# Patient Record
Sex: FEMALE | Race: WHITE | NOT HISPANIC OR LATINO | URBAN - METROPOLITAN AREA
[De-identification: names, ages, dates, MRNs, and addresses within clinical notes are randomized per-mention and may not be internally consistent; named-entity substitution may affect disease eponyms.]

---

## 2017-01-26 ENCOUNTER — IMPORTED ENCOUNTER (OUTPATIENT)
Dept: URBAN - METROPOLITAN AREA CLINIC 1 | Facility: CLINIC | Age: 72
End: 2017-01-26

## 2017-01-26 PROBLEM — H01.005: Noted: 2017-01-26

## 2017-01-26 PROBLEM — H04.123: Noted: 2017-01-26

## 2017-01-26 PROBLEM — H01.002: Noted: 2017-01-26

## 2017-01-26 PROBLEM — H11.441: Noted: 2017-01-26

## 2017-01-26 PROBLEM — H16.143: Noted: 2017-01-26

## 2017-01-26 PROBLEM — H35.032: Noted: 2017-01-26

## 2017-01-26 PROBLEM — H35.033: Noted: 2017-01-26

## 2017-01-26 PROBLEM — Z96.1: Noted: 2017-01-26

## 2017-01-26 PROBLEM — H35.013: Noted: 2017-01-26

## 2017-01-26 PROCEDURE — 92014 COMPRE OPH EXAM EST PT 1/>: CPT

## 2017-01-26 PROCEDURE — 92015 DETERMINE REFRACTIVE STATE: CPT

## 2017-01-26 NOTE — PATIENT DISCUSSION
1.  Severe Blepharitis anterior and posterior type OU- Controlled on Ocusoft Hypochlor Lid Spray CPM QD to eyelids. Continue Hot compresses BID OU x 5 minutes. Continue Tobradex RENEA OU PRN. 2.  SYDNI w/ PEK OU-The increase of artificial tears OU BID were recommended. 3.  Conjunctival cyst OD- Stable. Observation was recommended. 4. Pseudophakia OU (Torics OU)- Doing well. 5.  Athero Vascular Dz OU- Stable6. H/o residual chalazion RLL- resolved. 7.

## 2017-02-13 ENCOUNTER — IMPORTED ENCOUNTER (OUTPATIENT)
Dept: URBAN - METROPOLITAN AREA CLINIC 1 | Facility: CLINIC | Age: 72
End: 2017-02-13

## 2017-03-15 ENCOUNTER — IMPORTED ENCOUNTER (OUTPATIENT)
Dept: URBAN - METROPOLITAN AREA CLINIC 1 | Facility: CLINIC | Age: 72
End: 2017-03-15

## 2017-03-15 NOTE — PATIENT DISCUSSION
MRX done today by PMG. No changes. Advised patient it is ok to use this MRX for computer glasses if desired.

## 2017-09-19 ENCOUNTER — IMPORTED ENCOUNTER (OUTPATIENT)
Dept: URBAN - METROPOLITAN AREA CLINIC 1 | Facility: CLINIC | Age: 72
End: 2017-09-19

## 2017-09-19 PROBLEM — H01.004: Noted: 2017-09-19

## 2017-09-19 PROBLEM — H04.123: Noted: 2017-09-19

## 2017-09-19 PROBLEM — H01.001: Noted: 2017-09-19

## 2017-09-19 PROBLEM — H16.143: Noted: 2017-09-19

## 2017-09-19 PROCEDURE — 92012 INTRM OPH EXAM EST PATIENT: CPT

## 2017-09-19 NOTE — PATIENT DISCUSSION
1.  Severe Anterior/ Posterior Blepharitis OU- Continue Ocusoft Hypochlor Lid Spray CPM QD to eyelids. Continue Hot compresses BID x 5 minutes. Restart Tobradex RENEA to lids QHS QOD for 1 week then d/c. 2.  Dry Eyes OU- Cont ATs TID OU Routinely. 3.  Pseudophakia OU (Toric)- H/o YAG Cap OU 4. H/o GR I Athero Vascular Dz OU 5. H/o Residual Chalazion RLL- Resolved. Return for an appointment in Feb 30 with Dr. Laxmi Brito.

## 2017-11-20 ENCOUNTER — IMPORTED ENCOUNTER (OUTPATIENT)
Dept: URBAN - METROPOLITAN AREA CLINIC 1 | Facility: CLINIC | Age: 72
End: 2017-11-20

## 2017-11-20 PROBLEM — H01.001: Noted: 2017-11-20

## 2017-11-20 PROBLEM — H01.004: Noted: 2017-11-20

## 2017-11-20 PROBLEM — H00.015: Noted: 2017-11-20

## 2017-11-20 PROCEDURE — 92012 INTRM OPH EXAM EST PATIENT: CPT

## 2017-11-20 NOTE — PATIENT DISCUSSION
1.  Hordeolum LLL- Cont Hot compresses and Hypochlor spray BID to lids. 2.  Severe Anterior/ Posterior Blepharitis OU- Continue Ocusoft Hypochlor Lid Spray CPM QD to eyelids. Continue Hot compresses BID x 5 minutes. Can use Tobradex RENEA to lids QHS QOD for 1 week then d/c. 3.  Dry Eyes OU- Cont ATs TID OU Routinely. 4.  Pseudophakia OU (Toric)- H/o YAG Cap OU 5. H/o GR I Athero Vascular Dz OU 6. H/o Residual Chalazion RLL- Resolved. Return as scheduled with Dr. Bruce Terry.

## 2018-02-20 ENCOUNTER — IMPORTED ENCOUNTER (OUTPATIENT)
Dept: URBAN - METROPOLITAN AREA CLINIC 1 | Facility: CLINIC | Age: 73
End: 2018-02-20

## 2018-02-20 PROBLEM — H35.013: Noted: 2018-02-20

## 2018-02-20 PROBLEM — D23.11: Noted: 2018-02-20

## 2018-02-20 PROBLEM — H00.15: Noted: 2018-02-20

## 2018-02-20 PROBLEM — H01.005: Noted: 2018-02-20

## 2018-02-20 PROBLEM — H01.002: Noted: 2018-02-20

## 2018-02-20 PROBLEM — H43.813: Noted: 2018-02-20

## 2018-02-20 PROBLEM — H16.143: Noted: 2018-02-20

## 2018-02-20 PROBLEM — H04.123: Noted: 2018-02-20

## 2018-02-20 PROBLEM — Z96.1: Noted: 2018-02-20

## 2018-02-20 PROCEDURE — 92015 DETERMINE REFRACTIVE STATE: CPT

## 2018-02-20 PROCEDURE — 92014 COMPRE OPH EXAM EST PT 1/>: CPT

## 2018-02-20 NOTE — PATIENT DISCUSSION
1.  Chalazion LLL- Patient was compliant with hot compresses without resolution. Discussed option for Incision and Drainage. Risks and Benefits discussed with patient. Patient stated complete understanding and would like to proceed with procedure. Ok to resume ASA. 2.  Severe Blepharitis anterior and posterior type OU-  Continue Ocusoft Hypochlor Lid Spray CPM QD to eyelids. Continue Hot compresses BID x 5 minutes. Can use Tobradex RENEA to lids PRN 3. Papilloma LLL- appears benign. Observe. 5. SYDNI w/ PEK OU- Stable. The continuation of artificial tears were recommended. 6.  GR I Athero Vascular Dz OU- Stable7. PVD OU- Old stable. 8.  Pseudophakia OU (Torics OU)- H/o Yag OU. 9. Cysts RUL- Appears benign. Stable. 10.  Return for an appointment for I&D chalazion LLL on wednesday with Dr. Monique Woody.

## 2018-03-06 ENCOUNTER — IMPORTED ENCOUNTER (OUTPATIENT)
Dept: URBAN - METROPOLITAN AREA CLINIC 1 | Facility: CLINIC | Age: 73
End: 2018-03-06

## 2018-03-06 PROCEDURE — 67800 REMOVE EYELID LESION: CPT

## 2018-03-06 NOTE — PATIENT DISCUSSION
Incision and drainage of chalazion on the left lower eyelid: After proper informed consent was obtained the patient was taken to the operating room and placed supine on the operating table. The left lower eye lid was prepped in the standard surgical fashion. Xylocaine 1% and Epinephrine was infiltrated around the chalazion. A chalazion speculum was then placed and the eyelid was everted. A cross incision was made through the chalazion and immediate release of the lipogranulomatous material was expressed. A curette was used to further evacuate the chalazion cavity. The speculum was removed ointment was placed and a pressure patch was then applied. The patient tolerated the procedure well and there were no complications. Use Tobradex to lid QHS x3 days then return to using only PRN to lids OU.  RTC: 3 mo 10

## 2018-03-08 PROBLEM — H00.15: Noted: 2018-03-08

## 2018-06-05 ENCOUNTER — IMPORTED ENCOUNTER (OUTPATIENT)
Dept: URBAN - METROPOLITAN AREA CLINIC 1 | Facility: CLINIC | Age: 73
End: 2018-06-05

## 2018-06-05 PROBLEM — H01.004: Noted: 2018-06-05

## 2018-06-05 PROBLEM — H01.001: Noted: 2018-06-05

## 2018-06-05 PROCEDURE — 92012 INTRM OPH EXAM EST PATIENT: CPT

## 2018-06-05 NOTE — PATIENT DISCUSSION
1.  Severe Anterior and Posterior Blepharitis OU-  Continue Ocusoft Hypochlor Lid Spray CPM QD to eyelids. Continue Hot compresses BID x 5 minutes. Can use Tobradex RENEA to lids PRN. Discussed with patient could consider Cliradex Lid Wipes PRN. 2.  Papilloma LLL- appears benign. Observe. 3. SYDNI w/ PEK OU- Continue ATs TID OU Routinely 4. H/o GR I Athero Vascular Dz OU 5. H/o PVD OU6. Pseudophakia OU (Torics OU)- H/o YAG OU.7. Cysts RUL- Appears benign. Stable. 8.  H/o I&D of Chalazion LLL- Observe. Return for an appointment in 4 mo 10 with Dr. Janel Keane.

## 2018-09-25 ENCOUNTER — IMPORTED ENCOUNTER (OUTPATIENT)
Dept: URBAN - METROPOLITAN AREA CLINIC 1 | Facility: CLINIC | Age: 73
End: 2018-09-25

## 2018-09-25 PROBLEM — H16.143: Noted: 2018-09-25

## 2018-09-25 PROBLEM — D23.11: Noted: 2018-09-25

## 2018-09-25 PROBLEM — H01.001: Noted: 2018-09-25

## 2018-09-25 PROBLEM — Z96.1: Noted: 2018-09-25

## 2018-09-25 PROBLEM — H04.123: Noted: 2018-09-25

## 2018-09-25 PROBLEM — H02.821: Noted: 2018-09-25

## 2018-09-25 PROBLEM — H01.004: Noted: 2018-09-25

## 2018-09-25 PROCEDURE — 99213 OFFICE O/P EST LOW 20 MIN: CPT

## 2018-09-25 NOTE — PATIENT DISCUSSION
Lesion upper lid benign OD - The patient has a lesion of the right upper eyelid which appears benign. Measurements were taken and observation at regular intervals was recommended. The patient was asked to report  if there is any growth.

## 2018-09-25 NOTE — PATIENT DISCUSSION
1.  Severe Anterior and Posterior Blepharitis OU-  Much improved. Continue Ocusoft Hypochlor Lid Spray PRN QD to eyelids Hot compresses BID x 5 minutes. Ok continue Tobradex RENEA to lids PRN. Discussed with patient could consider Cliradex Lid Wipes PRN. 2.  Papilloma RUL/RLL- appears benign. Observe. Stable. 3. SYDNI w/ PEK OU- Increase ATs TID OU routinely. Urged compliance w/ TID tear use OU. Continue hot compresses. 4.  Pseudophakia OU (Torics OU)- H/o YAG OU.5. H/o GR I Athero Vascular Dz OU 6. H/o PVD OU7. H/o I&D of Chalazion LLL- Observe. 8.  Return for an appointment for 30 in Feb. with Dr. Shen Jasso.

## 2018-09-25 NOTE — PATIENT DISCUSSION
Lesion lower lid benign OD - The patient has a lesion of the right lower eyelid which appears benign. Measurements were taken and observation at regular intervals was recommended. The patient was asked to report  if there is any growth.

## 2018-11-15 ENCOUNTER — IMPORTED ENCOUNTER (OUTPATIENT)
Dept: URBAN - METROPOLITAN AREA CLINIC 1 | Facility: CLINIC | Age: 73
End: 2018-11-15

## 2018-11-15 PROBLEM — H00.11: Noted: 2018-11-15

## 2018-11-15 PROCEDURE — 92012 INTRM OPH EXAM EST PATIENT: CPT

## 2018-11-15 NOTE — PATIENT DISCUSSION
1.  RUL Medially Chalazion -- Recommend Hot compresses TID x 5 minutes for 3 weeks. If without improvement discussed with patient possible Incision and Drainage procedure. Risks and benefits discussed with patient and patient states full understanding. Begin Doxycycline PO 50mg BID X's 30 Days then d/c (Dispensed #60 & ERx'd). Patient to call back for possible I&D if no improvement by 11/19/18 / 11/20/18. 2.  Severe Anterior and Posterior Blepharitis OU -- Much improved. Continue Ocusoft Hypochlor Lid Spray PRN QD to eyelids Hot compresses BID x 5 minutes. Ok continue Tobradex RENEA to lids PRN. Discussed with patient could consider Cliradex Lid Wipes PRN. 3.  Papilloma RUL / RLL (appears benign) -- Observe. Stable. 4. SYDNI w/ PEK OU -- Recommend continue the frequent use of OTC AT's TID-QID OU routinely. Urged compliance w/ routine tear use OU. Continue hot compresses. 5.  Pseudophakia w/ h/o YAG Cap OU (Torics OU) -- Doing well.6. H/o GR I Athero Vascular Dz OU 7. H/o PVD OU8. H/o I&D LLL Chalazion -- Resolved. Return as scheduled in January 2019 for 30 OU appointment with Dr. Zeynep Lopez.

## 2018-11-15 NOTE — PATIENT DISCUSSION
Severe Anterior and Posterior Blepharitis OU-  Much improved. Continue Ocusoft Hypochlor Lid Spray PRN QD to eyelids Hot compresses BID x 5 minutes. Ok continue Tobradex RENEA to lids PRN. Discussed with patient could consider Cliradex Lid Wipes PRN. Papilloma RUL/RLL- appears benign. Observe. Stable. SYDNI w/ PEK OU- Increase ATs TID OU routinely. Urged compliance w/ TID tear use OU. Continue hot compresses. Pseudophakia OU (Torics OU)- H/o YAG OU. H/o GR I Athero Vascular Dz OU H/o PVD OUH/o I&D of Chalazion LLL- Observe.

## 2018-11-20 ENCOUNTER — IMPORTED ENCOUNTER (OUTPATIENT)
Dept: URBAN - METROPOLITAN AREA CLINIC 1 | Facility: CLINIC | Age: 73
End: 2018-11-20

## 2018-11-20 PROBLEM — H00.11: Noted: 2018-11-20

## 2018-11-20 PROCEDURE — 92012 INTRM OPH EXAM EST PATIENT: CPT

## 2018-11-20 NOTE — PATIENT DISCUSSION
1.  RUL Medially Chalazion- essentially resolved -- Cont Hot compresses TID x 5 minutes daily. Reduce Doxycycline PO 50mg Qdaily x3 weeks then d/c. 2.  Severe Anterior and Posterior Blepharitis OU -- Continue Ocusoft Hypochlor Lid Spray PRN QD to eyelids Hot compresses BID x 5 minutes. Cont Tobradex RENEA to lids PRN. Discussed with patient could consider Cliradex Lid Wipes PRN. 3.  Papilloma RUL / RLL (appears benign) -- Observe. 4.  SYDNI w/ PEK OU -- Recommend continue the frequent use of OTC AT's TID-QID OU routinely. Urged compliance w/ routine tear use OU. Continue hot compresses. 5.  Pseudophakia w/ h/o YAG Cap OU (Torics OU) -- Doing well.6. H/o GR I Athero Vascular Dz OU 7. H/o PVD OU8. H/o I&D LLL Chalazion -- Resolved. Return as scheduled with Dr. Mary Wolfe.

## 2019-01-22 ENCOUNTER — IMPORTED ENCOUNTER (OUTPATIENT)
Dept: URBAN - METROPOLITAN AREA CLINIC 1 | Facility: CLINIC | Age: 74
End: 2019-01-22

## 2019-01-22 PROBLEM — H01.004: Noted: 2019-01-22

## 2019-01-22 PROBLEM — H01.001: Noted: 2019-01-22

## 2019-01-22 PROCEDURE — 92012 INTRM OPH EXAM EST PATIENT: CPT

## 2019-01-22 NOTE — PATIENT DISCUSSION
1.  Severe Anterior and Posterior Blepharitis OU increased Scurf/ MGI today OU. Patient states she has backed off on usual eyeild routine-- Compliance with gtts/ compresses & eyelid scrubs discussed. Continue Ocusoft Hypochlor Lid Spray PRN QD to eyelids Hot compresses BID x 5 minutes. Cont Tobradex RENEA to lids PRN. Discussed with patient could consider Cliradex Lid Wipes PRN. 3.  Papilloma RUL / RLL (appears benign) -- Observe. 4.  SYDNI w/ PEK OU -- Continue OTC AT's TID-QID OU routinely. Urged compliance w/ routine tear use OU. Continue hot compresses. 5.  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 H/o GR I Athero Vascular Dz OU 7. H/o PVD OU8. H/o I&D RUL LLL Chalazion -- Resolved. Return for an appointment in 4 mo 30 with Dr. Dahlia Schmid.

## 2019-04-02 ENCOUNTER — IMPORTED ENCOUNTER (OUTPATIENT)
Dept: URBAN - METROPOLITAN AREA CLINIC 1 | Facility: CLINIC | Age: 74
End: 2019-04-02

## 2019-04-02 PROBLEM — H01.001: Noted: 2019-04-02

## 2019-04-02 PROBLEM — H16.143: Noted: 2019-04-02

## 2019-04-02 PROBLEM — H04.123: Noted: 2019-04-02

## 2019-04-02 PROBLEM — H01.004: Noted: 2019-04-02

## 2019-04-02 PROCEDURE — 92012 INTRM OPH EXAM EST PATIENT: CPT

## 2019-04-02 NOTE — PATIENT DISCUSSION
1.  Severe Anterior and Posterior Blepharitis OU - Maxitrol ana maría applied to lids OU in office today. Compliance with gtts/ compresses & eyelid scrubs discussed. Increase Hot compresses BID x 5 minutes continue Ocusoft Hypochlor Lid Spray PRN QD to eyelids. Cont Tobradex ANA MARÍA to lids PRN. Discussed with patient could consider Cliradex Lid Wipes PRN. Consider low does Doxycycline if no improvement with increase in hot compresses. 2.  SYDNI w/ PEK OU- Controlled. Cont ATs TID OU routinely 3. Papilloma RUL / RLL (appears benign) -- Observe. 4.  Pseudophakia w/ h/o YAG Cap OU (Toric OU) 5. H/o GR I Athero Vascular Dz OU 6. H/o PVD OU7. H/o I&D LLL Chalazion -- Resolved. Return for an appointment for Return as scheduled 30 with Dr. David Roberts.

## 2019-07-01 ENCOUNTER — IMPORTED ENCOUNTER (OUTPATIENT)
Dept: URBAN - METROPOLITAN AREA CLINIC 1 | Facility: CLINIC | Age: 74
End: 2019-07-01

## 2019-07-01 PROBLEM — H00.14: Noted: 2019-07-01

## 2019-07-01 PROCEDURE — 92012 INTRM OPH EXAM EST PATIENT: CPT

## 2019-07-01 NOTE — PATIENT DISCUSSION
1.  Early Chalazion VIRGIE -Increase Hot compresses TID x 5 minutes for 3 weeks. Begin Doxycycline 50mg po BID (Disp#60) If without improvement discussed with patient possible Incision and Drainage procedure. Risks and benefits discussed with patient and patient states full understanding. 2.  Severe Anterior and Posterior Blepharitis OU - Continue Tobradex renea applied to lids OU daily. Compliance with gtts/ compresses & eyelid scrubs discussed. Increase Hot compresses to TID x 5 minutes continue Ocusoft Hypochlor Lid Spray PRN QD to eyelids. Cont Tobradex RENEA to lids PRN. Discussed with patient could consider Cliradex Lid Wipes PRN. 3.  SYDNI w/ PEK OU- Controlled. Cont ATs TID OU routinely 4. Papilloma RUL / RLL (appears benign) -- Observe. 5.  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 H/o GR I Athero Vascular Dz OU 7. H/o PVD OU8.   H/o I&D LLL Chalazion

## 2019-07-22 ENCOUNTER — IMPORTED ENCOUNTER (OUTPATIENT)
Dept: URBAN - METROPOLITAN AREA CLINIC 1 | Facility: CLINIC | Age: 74
End: 2019-07-22

## 2019-07-22 PROBLEM — H00.14: Noted: 2019-07-22

## 2019-07-22 PROBLEM — H01.004: Noted: 2019-07-22

## 2019-07-22 PROBLEM — H01.001: Noted: 2019-07-22

## 2019-07-22 PROCEDURE — 92012 INTRM OPH EXAM EST PATIENT: CPT

## 2019-08-21 ENCOUNTER — IMPORTED ENCOUNTER (OUTPATIENT)
Dept: URBAN - METROPOLITAN AREA CLINIC 1 | Facility: CLINIC | Age: 74
End: 2019-08-21

## 2019-08-21 PROCEDURE — 99213 OFFICE O/P EST LOW 20 MIN: CPT

## 2019-08-21 NOTE — PATIENT DISCUSSION
1.  Chalazion left upper eyelid - Resolved Hot compresses TID routinely. Cont Doxycycline 50mg PO QD as maintenance regimen (erx). 2.  Severe Anterior and Posterior Blepharitis OU - Continue Tobradex ana maría applied to lids OU daily PRN. Compliance with gtts/ compresses & eyelid scrubs discussed. Increase Hot compresses to TID x 5 minutes continue Ocusoft Hypochlor Lid Spray PRN QD to eyelids. Consider Cliradex Lid Wipes PRN. 3.  SYDNI w/ PEK OU- Controlled. Cont ATs TID OU routinely 4. Papilloma RUL / RLL (appears benign) -- 5. 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 H/o GR I Athero Vascular Dz OU 7. H/o PVD OU8. H/o I&D LLL ChalazionReturn for an appointment in PRN 30 (patient is moving) with Dr. Zeynep Lopez.

## 2019-08-21 NOTE — PATIENT DISCUSSION
Severe Anterior and Posterior Blepharitis OU - Continue Tobradex ana maría applied to lids OU daily PRN. Compliance with gtts/ compresses & eyelid scrubs discussed. Increase Hot compresses to TID x 5 minutes continue Ocusoft Hypochlor Lid Spray PRN QD to eyelids. Consider Cliradex Lid Wipes PRN. 3.  SYDNI w/ PEK OU- Controlled. Cont ATs TID OU routinely 4. Papilloma RUL / RLL (appears benign) -- 5. 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 H/o GR I Athero Vascular Dz OU 7. H/o PVD OU8. H/o I&D LLL ChalazionReturn for an appointment in PRN 30 (patient is moving) with Dr. Jeromy Sanders.

## 2019-08-26 PROBLEM — H00.14: Noted: 2019-08-26

## 2019-09-12 ENCOUNTER — IMPORTED ENCOUNTER (OUTPATIENT)
Dept: URBAN - METROPOLITAN AREA CLINIC 1 | Facility: CLINIC | Age: 74
End: 2019-09-12

## 2019-09-12 PROBLEM — H01.001: Noted: 2019-09-12

## 2019-09-12 PROBLEM — H01.112: Noted: 2019-09-12

## 2019-09-12 PROBLEM — H01.004: Noted: 2019-09-12

## 2019-09-12 PROBLEM — H00.11: Noted: 2019-09-12

## 2019-09-12 PROCEDURE — 92012 INTRM OPH EXAM EST PATIENT: CPT

## 2019-09-12 NOTE — PATIENT DISCUSSION
1.  Early Chalazion right upper eyelid - Increase Doxycycline 50mg PO BID Hot compresses TID x 5 minutes for 3 weeks. If without improvement discussed with patient possible Incision and Drainage procedure. Risks and benefits discussed with patient and patient states full understanding. 2. Severe Anterior and Posterior Blepharitis OU - Continue Tobradex ana maría applied to lids OU daily PRN. Compliance with gtts/ compresses & eyelid scrubs discussed. Increase Hot compresses to TID x 5 minutes continue Ocusoft Hypochlor Lid Spray PRN QD to eyelids. Consider Cliradex Lid Wipes PRN. 3.  SYDNI w/ PEK OU- Controlled. Cont ATs TID OU routinely 4. Papilloma RUL / RLL (appears benign) 5. 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 H/o GR I Athero Vascular Dz OU 7. H/o PVD OU8. H/o I&D LLL ChalazionReturn for an appointment for Return as scheduled with Dr. José Antonio Shields.

## 2019-09-19 ENCOUNTER — IMPORTED ENCOUNTER (OUTPATIENT)
Dept: URBAN - METROPOLITAN AREA CLINIC 1 | Facility: CLINIC | Age: 74
End: 2019-09-19

## 2019-09-19 PROBLEM — H01.001: Noted: 2019-09-19

## 2019-09-19 PROBLEM — D23.112: Noted: 2019-09-19

## 2019-09-19 PROBLEM — D23.111: Noted: 2019-09-19

## 2019-09-19 PROBLEM — D23.121: Noted: 2019-09-19

## 2019-09-19 PROBLEM — H01.004: Noted: 2019-09-19

## 2019-09-19 PROCEDURE — 92012 INTRM OPH EXAM EST PATIENT: CPT

## 2019-09-19 NOTE — PATIENT DISCUSSION
1.  Severe Anterior and Posterior Blepharitis OU - Continue Tobradex ana maría applied to lids OU daily PRN. Compliance with gtts/ compresses & eyelid scrubs discussed. Increase Hot compresses to TID x 5 minutes continue Ocusoft Hypochlor Lid Spray PRN QD to eyelids. Consider Cliradex Lid Wipes PRN. 2.  Early Chalazion right upper eyelid - Cont Doxycycline 50mg PO BID. Increase Hot compresses TID x 5 minutes for 3 weeks. If without improvement discussed with patient possible Incision and Drainage procedure. Risks and benefits discussed with patient and patient states full understanding. 3. Papilloma RUL RLL VIRGIE: apppear benign - The patient has a lesion of the right upper eyelid right lower eyelid and left upper eyelid which appear benign. Will plan excision. 4.  SYDNI w/ PEK OU- Controlled. Cont ATs TID OU routinely 5. Papilloma RUL / RLL (appears benign) 6. Pseudophakia w/ h/o YAG Cap OU (Toric OU) 7. H/o GR I Athero Vascular Dz OU 8. H/o PVD OU9. H/o I&D LLL ChalazionReturn for an appointment in Excision papilloma RUL RLL VIRGIE with Dr. Bruce Terry. Return for an appointment in 3 months 10 with Dr. Bruce Terry.

## 2019-09-19 NOTE — PATIENT DISCUSSION
Papilloma VIRGIE OS - The patient has a lesion of the left upper eyelid which appears benign. Measurements were taken and observation at regular intervals was recommended. The patient was asked to report  if there is any growth.

## 2019-09-19 NOTE — PATIENT DISCUSSION
Papilloma RLL: apppears benign OD - The patient has a lesion of the right upper eyelid which appears benign. Measurements were taken and observation at regular intervals was recommended. The patient was asked to report  if there is any growth.

## 2019-09-19 NOTE — PATIENT DISCUSSION
Early Chalazion right upper eyelid - Cont Doxycycline 50mg PO BID. Increase Hot compresses TID x 5 minutes for 3 weeks. If without improvement discussed with patient possible Incision and Drainage procedure. Risks and benefits discussed with patient and patient states full understanding. 3. SYDNI w/ PEK OU- Controlled. Cont ATs TID OU routinely 4. Papilloma RUL / RLL (appears benign) 5. 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 H/o GR I Athero Vascular Dz OU 7. H/o PVD OU8.   H/o I&D LLL Chalazion

## 2019-10-09 ENCOUNTER — IMPORTED ENCOUNTER (OUTPATIENT)
Dept: URBAN - METROPOLITAN AREA CLINIC 1 | Facility: CLINIC | Age: 74
End: 2019-10-09

## 2019-10-09 PROCEDURE — 67840 REMOVE EYELID LESION: CPT

## 2019-10-09 NOTE — PATIENT DISCUSSION
Excision on  lesion on right upper eyelid and left upper eyelid: After proper informed consent was obtained the patient was taken to the operating room and placed supine on the operating table. The left eye was then prepped in the standard surgical fashion. Attention was then turned to the left upper eyelid where one percent Xylocaine with Epinephrine was infiltrated under the lesion. A scalpel was then used to make an elliptical incision around the lesion and Ladonna scissors were used to excise the mass free. Cautery was used for hemostasis and  ointment was applied. The wound was small and therefore left to heal by secondary intention. The patient tolerated the procedure well and there were no complications. The patient was instructed to use Torbadex ana maría QHS OU to lids. Return for an appointment for Return as scheduled with Dr. Jane Churchill.

## 2019-10-09 NOTE — PATIENT DISCUSSION
Excision of lesio on right upper eyelid:  After proper informed consent was obtained the patient was taken to the operating room and placed supine on the operating table. The right eye was then prepped in the standard surgical fashion. Attention was then turned to the right upper eyelid where one percent Xylocaine with Epinephrine was infiltrated under the lesion. A scalpel was then to make an elliptical incision around the lesion and Ladonna scissors were used to excise the mass free. Cautery was used for hemostasis and ointment was applied. The wound was small and therefore left to heal by secondary intention. The patient tolerated the procedure well and there were no complications. The patient was instructed to use (INSERT OINTMENT HERE) and will return for a post operative appointment.

## 2019-10-10 ENCOUNTER — IMPORTED ENCOUNTER (OUTPATIENT)
Dept: URBAN - METROPOLITAN AREA CLINIC 1 | Facility: CLINIC | Age: 74
End: 2019-10-10

## 2019-10-10 PROCEDURE — 99024 POSTOP FOLLOW-UP VISIT: CPT

## 2019-10-10 NOTE — PATIENT DISCUSSION
1.  S/p Papilloma VIRGIE removal - Healing well cont Tobradex QHS OU to [place] for an appointment for Return as scheduled with Dr. Blaise Leal.

## 2019-10-16 PROBLEM — D23.11: Noted: 2019-10-16

## 2019-10-16 PROBLEM — D23.12: Noted: 2019-10-16

## 2019-12-19 ENCOUNTER — OFFICE VISIT (OUTPATIENT)
Dept: CARDIOLOGY CLINIC | Age: 74
End: 2019-12-19

## 2019-12-19 VITALS
HEART RATE: 68 BPM | OXYGEN SATURATION: 95 % | BODY MASS INDEX: 24.96 KG/M2 | DIASTOLIC BLOOD PRESSURE: 80 MMHG | SYSTOLIC BLOOD PRESSURE: 142 MMHG | HEIGHT: 67 IN | WEIGHT: 159 LBS | RESPIRATION RATE: 20 BRPM

## 2019-12-19 DIAGNOSIS — R00.2 PALPITATIONS: ICD-10-CM

## 2019-12-19 DIAGNOSIS — I65.23 BILATERAL CAROTID ARTERY STENOSIS: ICD-10-CM

## 2019-12-19 DIAGNOSIS — R06.09 DOE (DYSPNEA ON EXERTION): ICD-10-CM

## 2019-12-19 DIAGNOSIS — R53.82 CHRONIC FATIGUE: ICD-10-CM

## 2019-12-19 DIAGNOSIS — I48.0 PAROXYSMAL ATRIAL FIBRILLATION (HCC): Primary | ICD-10-CM

## 2019-12-19 DIAGNOSIS — R07.9 CHEST PAIN, UNSPECIFIED TYPE: ICD-10-CM

## 2019-12-19 DIAGNOSIS — I10 ESSENTIAL HYPERTENSION: ICD-10-CM

## 2019-12-19 RX ORDER — HYDRALAZINE HYDROCHLORIDE 25 MG/1
25 TABLET, FILM COATED ORAL AS NEEDED
COMMUNITY
End: 2022-03-01 | Stop reason: SDUPTHER

## 2019-12-19 RX ORDER — UBIDECARENONE 50 MG
CAPSULE ORAL
COMMUNITY

## 2019-12-19 RX ORDER — ASPIRIN 81 MG/1
TABLET ORAL DAILY
COMMUNITY
End: 2020-01-24

## 2019-12-19 RX ORDER — LOSARTAN POTASSIUM 100 MG/1
100 TABLET ORAL DAILY
COMMUNITY
End: 2020-01-24 | Stop reason: ALTCHOICE

## 2019-12-19 RX ORDER — DOXYCYCLINE 50 MG/1
50 CAPSULE ORAL DAILY
COMMUNITY
End: 2021-03-12 | Stop reason: ALTCHOICE

## 2019-12-19 RX ORDER — CALCIUM ACETATE 667 MG/1
CAPSULE ORAL
COMMUNITY
End: 2020-01-24 | Stop reason: ALTCHOICE

## 2019-12-19 NOTE — LETTER
12/19/19 Patient: Gurpreet Montoya YOB: 1945 Date of Visit: 12/19/2019 Harry Bright MD 
[facility] [address] [place] [id number] VIA Facsimile: [phone] Dear Harry Bright MD, Thank you for referring Ms. Gurpreet Montoya to [facility] for evaluation. My notes for this consultation are attached. If you have questions, please do not hesitate to call me. I look forward to following your patient along with you. Sincerely, Marlin Horta MD

## 2019-12-19 NOTE — PROGRESS NOTES
[address]  [address]  [place], [address] [facility]    Office [phone]  Fax [phone]  Cell [phone]       Rogerio Baker is a 66 y.o. female self-referred for establishment of cardiologist - palpitations and HTN. Her daughter You Lubin. Diagnoses  Encounter Diagnoses     ICD-10-CM ICD-9-CM   1. Paroxysmal atrial fibrillation (HCC) I48.0 427.31   2. Essential hypertension I10 401.9   3. Palpitations R00.2 785.1   4. Chronic fatigue R53.82 780.79   5. ORBB (dyspnea on exertion) R06.09 786.09   6. Chest pain, unspecified type R07.9 786.50   7. Bilateral carotid artery stenosis I65.23 433.10     433.30       Assessment/Recommendations:    Palpitations. I reviewed her recent Holter monitor. Although the report officially stated SVT, it looks more like AF to my review. Substrate = HTN. We had a long discussion about rhythm control and stroke prevention. Her exam and recent EKG normal. Will evaluate further with echo and lexiscan Cardiolite. Asked her to obtain copies of lab reports from former PCP and endocrinologist. She would benefit from an [facility] - will discuss after reviewing her testing. HTN. BP's have been somewhat labile over the past year, although it is hard to get a clear sense of her BP control. Normotensive in the office today. I have asked her to monitor BP BID  - Continue Losartan 100mg/d, Metoprolol 25mg/d  - Evaluate for LVH with echo. Chronic fatigue, ROBB, and atypical chest pain. Suspect the stress of her 's death playing a role. Would like to see her recent labs as noted above. Lexiscan Cardiolite and echo as noted above. Phone follow up after reviewing tests          Subjective:    No prior cardiac hx other than chronic HTN. Recent palpitations. Had an event recorder demonstrating short bursts of SVT. Rogerio Baker reports she moved here from [place] 1 week ago.  She reports she feels fatigued often so much that she cannot think clearly. She has random episodes of chest tightness. She says she \"gets winded\" with activity. Patient denies any exertional chest pain, syncope, orthopnea, edema or paroxysmal nocturnal dyspnea. Frequent palpitations, short lived. Poorly described. She has prediabetes. She is statin intolerant with hair loss. She ambulates with a cane. 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 Cardiac risk factors   HTN yes  DM  no  Smoking no  Family hx of CAD yes, father with stroke      Cardiac testing  Carotid duplex 10/10/19 - 1-49% bilateral  7 day Holter monitor 10/10-10/17/19 - short bursts of SVT reported      Past Medical History:   Diagnosis Date    Asthma     Diverticulitis     History of prolapse of bladder     History of UTI     Hypertension     Recurrent UTI     Thyroid dysfunction     Unspecified hydronephrosis     Urinary tract infection         Social History     Socioeconomic History    Marital status:      Spouse name: Not on file    Number of children: Not on file    Years of education: Not on file    Highest education level: Not on file   Tobacco Use    Smoking status: Never Smoker    Smokeless tobacco: Never Used   Substance and Sexual Activity    Alcohol use: Not Currently    Drug use: No    Sexual activity: Never       Current Outpatient Medications   Medication Sig Dispense Refill    aspirin delayed-release 81 mg tablet Take  by mouth daily.  calcium acetate (PHOSLO) 667 mg cap Take  by mouth three (3) times daily (with meals).  losartan (COZAAR) 100 mg tablet Take 100 mg by mouth daily.  hydrALAZINE (APRESOLINE) 25 mg tablet Take 25 mg by mouth as needed.  doxycycline (MONODOX) 50 mg capsule Take 50 mg by mouth two (2) times a day.  B.infantis-B.ani-B.long-B.bifi (PROBIOTIC 4X) 10-15 mg TbEC Take  by mouth.  red yeast rice 600 mg tab Take  by mouth.       cholecalciferol (VITAMIN D3) 1,000 unit tablet 2,000 Units.  cranberry extract 425 mg cap 425 mg.      multivitamin (ONE A DAY) tablet Take by Mouth.  metoprolol succinate (TOPROL XL) 50 mg XL tablet Take  by mouth daily.  omega-3 fatty acids-vitamin e (FISH OIL) 1,000 mg cap Take 1 Cap by mouth. Allergies   Allergen Reactions    Latex Other (comments) and Hives    Codeine Other (comments)     Other reaction(s): psychological reaction  \"makes me crazy\"  confused    Neomycin-Bacitracin-Polymyxin Hives    Neosporin [Hydrocortisone] Other (comments)    Polyester Fibers Rash    Polysporin [Bacitracin-Polymyxin B] Other (comments) and Hives    Povidone-Iodine Other (comments)    Pravastatin Other (comments)     Elevated LFT's. Hair loss.  Tetracycline Rash          Review of Symptoms:  Constitutional: Negative for fever, chills, malaise and diaphoresis. +fatigue  Respiratory: Negative for cough, hemoptysis, sputum production, and wheezing. +ROBB  Cardiovascular: Negative for orthopnea, claudication, leg swelling and PND. +chest tightness, palpitations  Gastrointestinal: Negative for heartburn, nausea, vomiting, blood in stool and melena. Genitourinary: Negative for dysuria and flank pain. Musculoskeletal: Negative for joint pain and back pain. +right knee pain  Skin: Negative for rash. Neurological: Negative for focal weakness, seizures, loss of consciousness, weakness and headaches. Endo/Heme/Allergies: Does not bruise/bleed easily. Psychiatric/Behavioral: Negative for memory loss. The patient does not have insomnia.       Physical Exam  Visit Vitals  /80   Pulse 68   Resp 20   Ht 5' 6.5\" (1.689 m)   Wt 159 lb (72.1 kg)   SpO2 95%   BMI 25.28 kg/m²     Wt Readings from Last 3 Encounters:   12/19/19 159 lb (72.1 kg)   11/13/17 107 lb (48.5 kg)   09/20/16 152 lb (68.9 kg)     General - WDWN  HEENT: Eyes without jaundice, Hearing intact  Neck - JVP normal, thyroid nl  Cardiac - normal S1,S2, no murmurs, rubs or gallops. No clicks  Vascular - carotids without bruits, radials, femorals and pedal pulses equal bilateral  Lungs - clear to auscultation bilaterals, no rales ,wheezing or rhonchi  Abd - soft nontender, no HSM, no abd bruits  Extremities - no edema  Neuro - nonfocal, normal gait  Psych - mood and affect normal    Labs:      Cardiographics  EKG 10/10/19 - SR 60, , otherwise normal  Carotid duplex 10/10/19 - 1-49% bilateral  7 day Holter monitor 10/10-10/17/19 - short bursts of SVT reported          Written by Manual Hives, as dictated by Victor Hugo Hazel M.D.      Victor Hugo Hazel MD

## 2019-12-19 NOTE — PATIENT INSTRUCTIONS
-Get updated labs from your primary care doctor and endocrinologist. Bring the reports when you come in for your echo. -Monitor your blood pressure at home.

## 2019-12-19 NOTE — PROGRESS NOTES
Visit Vitals  /80   Pulse 68   Resp 20   Ht 5' 6.5\" (1.689 m)   Wt 159 lb (72.1 kg)   SpO2 95%   BMI 25.28 kg/m²     Establishing new cardiologist.

## 2020-01-10 ENCOUNTER — TELEPHONE (OUTPATIENT)
Dept: CARDIOLOGY CLINIC | Age: 75
End: 2020-01-10

## 2020-01-10 NOTE — TELEPHONE ENCOUNTER
Patient has some questions about her stress test information.  Please advise    [phone]

## 2020-01-10 NOTE — TELEPHONE ENCOUNTER
PTIDX2 reviewed stress test instructions. Will hold metoprolol night before and no caffeine.   Patient would like test results and clinic note faxed to  when available

## 2020-01-14 ENCOUNTER — TELEPHONE (OUTPATIENT)
Dept: CARDIOLOGY CLINIC | Age: 75
End: 2020-01-14

## 2020-01-14 NOTE — TELEPHONE ENCOUNTER
Patient would like to know if the office has received her lab work from her doctor in [place]. Please advise.      Phone: [phone]

## 2020-01-16 ENCOUNTER — TELEPHONE (OUTPATIENT)
Dept: CARDIOLOGY CLINIC | Age: 75
End: 2020-01-16

## 2020-01-16 NOTE — TELEPHONE ENCOUNTER
Patient would like to give Jessica some blood pressure readings if still needed.  She would also like to know if her recent records can be sent to her endocrinologist  Dr Brii Duff Phone: [phone] Fax: [phone]    Phone: [phone] (please leave message if no answer)

## 2020-01-18 ENCOUNTER — TELEPHONE (OUTPATIENT)
Dept: CARDIOLOGY CLINIC | Age: 75
End: 2020-01-18

## 2020-01-18 NOTE — TELEPHONE ENCOUNTER
Lexiscan cardiolite 1/15/20 - normal perfusion  Echo 1/15/20 - EF 60%, no LVH, mild LAE      No structural or ischemic heart disease except mild LAE    Has PAF documented on holter. No ongoing sx. Substrate = HTN    BPs borderline elevated at home. She attributes this stress of moving and recent death of her . Continue to monitor, may need Rx escalation.      Atrial Fibrillation CHADSVASC2 Score Stroke Risk:   66 y.o. > 76        +2    female Female +1   CHF HX: No    + 0   HTN HX: Yes    +1   Stroke/TIA/Thromboembolism No    +0   Vascular Disease HX: No    + 0   Diabetes Mellitus No    + 0   CHADSVASC 2 Score 4      Annual Stroke Risk 4.8%- moderate-high          Favor long term anticoagulation with Eliquis 5 mg bid (reviewed labs from April 2019 - normal CBC and GFR)  Reviewed pro/cons in detail, bleeding risk, etc  She will come in provide mail order pharmacy information to [facility]  RTC 6 months

## 2020-01-20 ENCOUNTER — TELEPHONE (OUTPATIENT)
Dept: CARDIOLOGY CLINIC | Age: 75
End: 2020-01-20

## 2020-01-20 NOTE — TELEPHONE ENCOUNTER
apppointment made with Pranay Kennedy for Friday to discuss Eliquis and Afib.  Daughter has questions

## 2020-01-20 NOTE — TELEPHONE ENCOUNTER
Patient states Dr. Bryan Lozano called her on Saturday and told her about a condition that she has. She states he asked if she wanted to come in for a consult with her daughter. She states they would like to come in and would like to see him this week or next week. Please advise.      Phone: [phone]

## 2020-01-24 ENCOUNTER — OFFICE VISIT (OUTPATIENT)
Dept: CARDIOLOGY CLINIC | Age: 75
End: 2020-01-24

## 2020-01-24 ENCOUNTER — OFFICE VISIT (OUTPATIENT)
Dept: INTERNAL MEDICINE CLINIC | Age: 75
End: 2020-01-24

## 2020-01-24 VITALS
HEART RATE: 71 BPM | WEIGHT: 160.12 LBS | SYSTOLIC BLOOD PRESSURE: 142 MMHG | OXYGEN SATURATION: 98 % | DIASTOLIC BLOOD PRESSURE: 70 MMHG | HEIGHT: 65 IN | BODY MASS INDEX: 26.68 KG/M2

## 2020-01-24 DIAGNOSIS — E07.9 THYROID DYSFUNCTION: ICD-10-CM

## 2020-01-24 DIAGNOSIS — Z87.828 H/O BURNS: ICD-10-CM

## 2020-01-24 DIAGNOSIS — N13.30 HYDRONEPHROSIS: ICD-10-CM

## 2020-01-24 DIAGNOSIS — I89.0 CHRONIC ACQUIRED LYMPHEDEMA: ICD-10-CM

## 2020-01-24 DIAGNOSIS — J47.9 BRONCHIECTASIS WITHOUT COMPLICATION (HCC): ICD-10-CM

## 2020-01-24 DIAGNOSIS — I10 HYPERTENSION, UNSPECIFIED TYPE: ICD-10-CM

## 2020-01-24 DIAGNOSIS — K57.92 DIVERTICULITIS: ICD-10-CM

## 2020-01-24 DIAGNOSIS — Z00.00 MEDICARE ANNUAL WELLNESS VISIT, SUBSEQUENT: ICD-10-CM

## 2020-01-24 DIAGNOSIS — T46.6X5A ADVERSE REACTION TO STATIN MEDICATION: ICD-10-CM

## 2020-01-24 DIAGNOSIS — R79.89 LFT ELEVATION: ICD-10-CM

## 2020-01-24 DIAGNOSIS — I65.23 BILATERAL CAROTID ARTERY STENOSIS: ICD-10-CM

## 2020-01-24 DIAGNOSIS — I10 HTN, GOAL BELOW 130/80: Primary | ICD-10-CM

## 2020-01-24 DIAGNOSIS — R31.9 HEMATURIA: ICD-10-CM

## 2020-01-24 DIAGNOSIS — I48.0 PAROXYSMAL ATRIAL FIBRILLATION (HCC): Primary | ICD-10-CM

## 2020-01-24 DIAGNOSIS — K56.609 SBO (SMALL BOWEL OBSTRUCTION) (HCC): ICD-10-CM

## 2020-01-24 DIAGNOSIS — Z78.9 STATIN INTOLERANCE: ICD-10-CM

## 2020-01-24 RX ORDER — METOPROLOL SUCCINATE 50 MG/1
TABLET, EXTENDED RELEASE ORAL
Qty: 90 TAB | Refills: 2 | Status: SHIPPED | OUTPATIENT
Start: 2020-01-24 | End: 2020-06-02 | Stop reason: SDUPTHER

## 2020-01-24 RX ORDER — LOSARTAN POTASSIUM 50 MG/1
TABLET ORAL
Qty: 180 TAB | Refills: 2 | Status: SHIPPED | OUTPATIENT
Start: 2020-01-24 | End: 2020-06-02 | Stop reason: SDUPTHER

## 2020-01-24 RX ORDER — DOXYCYCLINE HYCLATE 50 MG/1
CAPSULE ORAL
COMMUNITY
Start: 2020-01-08 | End: 2021-03-12 | Stop reason: ALTCHOICE
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
## 2020-01-24 NOTE — PATIENT INSTRUCTIONS
Medicare Wellness Visit, Female The best way to live healthy is to have a lifestyle where you eat a well-balanced diet, exercise regularly, limit alcohol use, and quit all forms of tobacco/nicotine, if applicable. Regular preventive services are another way to keep healthy. Preventive services (vaccines, screening tests, monitoring & exams) can help personalize your care plan, which helps you manage your own care. Screening tests can find health problems at the earliest stages, when they are easiest to treat. [facility] follows the current, evidence-based guidelines published by the [facility] Nithin Quintanilla ([facility]) when recommending preventive services for our patients. Because we follow these guidelines, sometimes recommendations change over time as research supports it. (For example, mammograms used to be recommended annually. Even though Medicare will still pay for an annual mammogram, the newer guidelines recommend a mammogram every two years for women of average risk). Of course, you and your doctor may decide to screen more often for some diseases, based on your risk and your co-morbidities (chronic disease you are already diagnosed with). Preventive services for you include: - Medicare offers their members a free annual wellness visit, which is time for you and your primary care provider to discuss and plan for your preventive service needs. Take advantage of this benefit every year! 
-All adults over the age of 72 should receive the recommended pneumonia vaccines. Current USPSTF guidelines recommend a series of two vaccines for the best pneumonia protection.  
-All adults should have a flu vaccine yearly and a tetanus vaccine every 10 years.  
-All adults age 48 and older should receive the shingles vaccines (series of two vaccines). -All adults age 38-68 who are overweight should have a diabetes screening test once every three years. -All adults born between [birth year] and 1967 should be screened once for Hepatitis C. 
-Other screening tests and preventive services for persons with diabetes include: an eye exam to screen for diabetic retinopathy, a kidney function test, a foot exam, and stricter control over your cholesterol.  
-Cardiovascular screening for adults with routine risk involves an electrocardiogram (ECG) at intervals determined by your doctor.  
-Colorectal cancer screenings should be done for adults age 54-65 with no increased risk factors for colorectal cancer. There are a number of acceptable methods of screening for this type of cancer. Each test has its own benefits and drawbacks. Discuss with your doctor what is most appropriate for you during your annual wellness visit. The different tests include: colonoscopy (considered the best screening method), a fecal occult blood test, a fecal DNA test, and sigmoidoscopy. 
 
-A bone mass density test is recommended when a woman turns 65 to screen for osteoporosis. This test is only recommended one time, as a screening. Some providers will use this same test as a disease monitoring tool if you already have osteoporosis. -Breast cancer screenings are recommended every other year for women of normal risk, age 54-69. 
-Cervical cancer screenings for women over age 72 are only recommended with certain risk factors. Here is a list of your current Health Maintenance items (your personalized list of preventive services) with a due date: 
Health Maintenance Due Topic Date Due  
 DTaP/Tdap/Td  (1 - Tdap) 07/27/1956  Shingles Vaccine (1 of 2) 07/27/1993  Glaucoma Screening   07/27/2008 [facility] Annual Well Visit  03/17/2018  Flu Vaccine  08/01/2019

## 2020-01-24 NOTE — PATIENT INSTRUCTIONS
Please start with your Eliquis 5 mg BID Suspend your ASA for now. We will check your blood work in 1 month to ensure your Hemoglobin is stable. I will phone you with the results. Continue to monitor your stool carefully and notify me if you have any concerns for bleeding. We would recommend that you arrange for a GI evaluation just for a baseline assessment. Dr. Bruce Ventura [facility], [address]  
[phone] [facility] [id number] [place] [address]
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
## 2020-01-24 NOTE — PROGRESS NOTES
Chief Complaint   Patient presents with    Irregular Heart Beat    Hypertension       Visit Vitals  /70 (BP 1 Location: Left arm, BP Patient Position: Sitting)   Pulse 71   Ht 5' 5\" (1.651 m)   Wt 160 lb 1.9 oz (72.6 kg)   SpO2 98%   BMI 26.65 kg/m²       Patient in offices states she is a little lightheaded, due to fasting for other doctor no other cardiac complaints. No hospital stays.     No cardiac refills

## 2020-01-27 ENCOUNTER — TELEPHONE (OUTPATIENT)
Dept: INTERNAL MEDICINE CLINIC | Age: 75
End: 2020-01-27

## 2020-01-27 VITALS
TEMPERATURE: 97.9 F | RESPIRATION RATE: 16 BRPM | HEART RATE: 69 BPM | BODY MASS INDEX: 25.24 KG/M2 | SYSTOLIC BLOOD PRESSURE: 147 MMHG | DIASTOLIC BLOOD PRESSURE: 74 MMHG | OXYGEN SATURATION: 94 % | WEIGHT: 160.8 LBS | HEIGHT: 67 IN

## 2020-01-27 NOTE — TELEPHONE ENCOUNTER
Patient is concerned if she should continue taking calcium. 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 She notes that her calcium has dropped to 9.1 while taking calcium. Mentioned that during her last visit she was told to discontinue calcium. Please advise.

## 2020-01-30 NOTE — TELEPHONE ENCOUNTER
Patient notified can take calcium citrate 500 mg daily. Patient states she has 600 mg on hand & will take that.

## 2020-02-17 ENCOUNTER — TELEPHONE (OUTPATIENT)
Dept: CARDIOLOGY CLINIC | Age: 75
End: 2020-02-17

## 2020-02-17 NOTE — TELEPHONE ENCOUNTER
Patient requesting to speak with Dr. Lawson Lopez in regards to the brochure she received and eliquis, baby aspiran.  Please advise    JKZWZ:[phone]

## 2020-02-18 NOTE — TELEPHONE ENCOUNTER
PTIDX2 clarified to hold ASA and education on Eliquis and bleeding given.   Patient verbalized understanding

## 2020-03-18 LAB
ERYTHROCYTE [DISTWIDTH] IN BLOOD BY AUTOMATED COUNT: 12.7 % (ref 11.7–15.4)
HCT VFR BLD AUTO: 39 % (ref 34–46.6)
HGB BLD-MCNC: 13.3 G/DL (ref 11.1–15.9)
MCH RBC QN AUTO: 30 PG (ref 26.6–33)
MCHC RBC AUTO-ENTMCNC: 34.1 G/DL (ref 31.5–35.7)
MCV RBC AUTO: 88 FL (ref 79–97)
PLATELET # BLD AUTO: 303 X10E3/UL (ref 150–450)
RBC # BLD AUTO: 4.43 X10E6/UL (ref 3.77–5.28)
WBC # BLD AUTO: 7.2 X10E3/UL (ref 3.4–10.8)

## 2020-03-24 ENCOUNTER — TELEPHONE (OUTPATIENT)
Dept: INTERNAL MEDICINE CLINIC | Age: 75
End: 2020-03-24

## 2020-03-24 NOTE — TELEPHONE ENCOUNTER
Incoming call from patient who reports while out walking today she fell & landed on her left knee. She notes that was the she had a TKR on. She denies hitting her head but notes a few mild scrapes. She called because she takes eliquis & questions what is her risk. Patient denies excessive bleeding. Advised patient to watch out for signs of excessive bleeding for now. Discussed her knee may become more painful from the today's trauma. Advised she can apply ice to her knee to help with swelling. Advised if pain becomes worse or she is unable to bear weight she will need an appt either with ortho on call or here for x-rays & eval. Patient voiced understanding.

## 2020-03-25 ENCOUNTER — TELEPHONE (OUTPATIENT)
Dept: CARDIOLOGY CLINIC | Age: 75
End: 2020-03-25

## 2020-03-25 NOTE — TELEPHONE ENCOUNTER
PTIDX2 notified of normal CBC.  patient verbalized understanding and had no further questions at this time

## 2020-03-25 NOTE — TELEPHONE ENCOUNTER
----- Message from Lindsey Smith MD sent at 3/25/2020 10:11 AM EDT -----  Normal CBC.  Please let her know

## 2020-05-27 ENCOUNTER — HOSPITAL ENCOUNTER (OUTPATIENT)
Dept: LAB | Age: 75
Discharge: HOME | End: 2020-05-27
Payer: MEDICARE

## 2020-05-27 PROCEDURE — 80053 COMPREHEN METABOLIC PANEL: CPT

## 2020-05-27 PROCEDURE — 83036 HEMOGLOBIN GLYCOSYLATED A1C: CPT

## 2020-06-02 ENCOUNTER — TELEPHONE (OUTPATIENT)
Dept: INTERNAL MEDICINE CLINIC | Age: 75
End: 2020-06-02

## 2020-06-02 ENCOUNTER — OFFICE VISIT (OUTPATIENT)
Dept: INTERNAL MEDICINE CLINIC | Age: 75
End: 2020-06-02

## 2020-06-02 VITALS
DIASTOLIC BLOOD PRESSURE: 76 MMHG | TEMPERATURE: 98.1 F | HEART RATE: 62 BPM | WEIGHT: 161.8 LBS | SYSTOLIC BLOOD PRESSURE: 140 MMHG | OXYGEN SATURATION: 96 % | HEIGHT: 65 IN | BODY MASS INDEX: 26.96 KG/M2 | RESPIRATION RATE: 16 BRPM

## 2020-06-02 DIAGNOSIS — K62.5 BRBPR (BRIGHT RED BLOOD PER RECTUM): ICD-10-CM

## 2020-06-02 DIAGNOSIS — I48.0 PAROXYSMAL ATRIAL FIBRILLATION (HCC): ICD-10-CM

## 2020-06-02 DIAGNOSIS — I10 HTN, GOAL BELOW 130/80: ICD-10-CM

## 2020-06-02 DIAGNOSIS — J45.40 MODERATE PERSISTENT ASTHMA WITHOUT COMPLICATION: Primary | ICD-10-CM

## 2020-06-02 DIAGNOSIS — R73.03 PREDIABETES: ICD-10-CM

## 2020-06-02 DIAGNOSIS — L02.92 BOIL: ICD-10-CM

## 2020-06-02 PROBLEM — D64.9 ANEMIA: Status: ACTIVE | Noted: 2020-06-02

## 2020-06-02 PROBLEM — E55.9 VITAMIN D DEFICIENCY: Status: ACTIVE | Noted: 2020-06-02

## 2020-06-02 PROBLEM — E04.1 THYROID NODULE: Status: ACTIVE | Noted: 2020-06-02

## 2020-06-02 PROBLEM — E78.2 MIXED HYPERLIPIDEMIA: Status: ACTIVE | Noted: 2020-06-02

## 2020-06-02 RX ORDER — MUPIROCIN CALCIUM 20 MG/G
CREAM TOPICAL 2 TIMES DAILY
Qty: 15 G | Refills: 3 | Status: SHIPPED | OUTPATIENT
Start: 2020-06-02 | End: 2021-11-02 | Stop reason: ALTCHOICE

## 2020-06-02 RX ORDER — ALBUTEROL SULFATE 90 UG/1
1 AEROSOL, METERED RESPIRATORY (INHALATION)
Qty: 1 INHALER | Refills: 2 | Status: SHIPPED | OUTPATIENT
Start: 2020-06-02 | End: 2022-03-01 | Stop reason: SDUPTHER

## 2020-06-02 RX ORDER — LOSARTAN POTASSIUM 50 MG/1
TABLET ORAL
Qty: 180 TAB | Refills: 2 | Status: SHIPPED | OUTPATIENT
Start: 2020-06-02 | End: 2020-09-14 | Stop reason: SDUPTHER

## 2020-06-02 RX ORDER — FLUTICASONE FUROATE AND VILANTEROL TRIFENATATE 100; 25 UG/1; UG/1
1 POWDER RESPIRATORY (INHALATION) DAILY
Qty: 3 INHALER | Refills: 3 | Status: SHIPPED | OUTPATIENT
Start: 2020-06-02 | End: 2022-03-01 | Stop reason: SDUPTHER

## 2020-06-02 RX ORDER — METOPROLOL SUCCINATE 50 MG/1
TABLET, EXTENDED RELEASE ORAL
Qty: 90 TAB | Refills: 2 | Status: SHIPPED | OUTPATIENT
Start: 2020-06-02 | End: 2020-09-14 | Stop reason: SDUPTHER

## 2020-06-02 RX ORDER — CALCIUM ACETATE 667 MG/1
TABLET ORAL
COMMUNITY

## 2020-06-02 NOTE — TELEPHONE ENCOUNTER
Patient request her height to be changed on her intake profile patient states she is 6'6\" not 5'5\" Patient best contact [phone] - thank you

## 2020-06-02 NOTE — TELEPHONE ENCOUNTER
Pt was given her lab orders for future J6D and metabolic to have done before appt in Oct. If orders need to be updated before she has done, mail them to her please.

## 2020-06-02 NOTE — PROGRESS NOTES
HISTORY OF PRESENT ILLNESS  Christiana Carnes is a 66 y.o. female. HPI  Med check. Issues:  1. Since I last saw her she has been started on Eliquis by cardiology because of paroxysmal a-fib. She is worried about being on this and wants to be on a lower dose. She has not had significant bleeding, but does note a little bit of bright red blood per rectum, which has been better in the last month. Strongly encouraged to see GI for evaluation. 2. Impaired fasting glucose. A1c was 5.9. Admits to dietary noncompliance. 3. Hypertension, on Toprol 50 and Losartan 100. Blood pressure variable at home between 120-180, although recently has not been over 160. Has Hydralazine for prn use. 4. Recurrent boils. Request Bactroban. 5. Asthma. Feels that since moving to [place] she is having more trouble with feeling intermittently winded and some congestion. Needs a new Albuterol inhaler. Feels about every two weeks that she might reach for it. Discussed preventive long acting inhaler, Breo. Will start. Discussed side effects. 6. At end of visit mentions she thinks she might have a breast lump. Will refer to the breast surgeons. Review of Systems   Constitutional: Negative for chills, diaphoresis, fever, malaise/fatigue and weight loss. HENT: Negative for congestion, sinus pain and sore throat. Respiratory: Positive for cough (intermittent dry) and shortness of breath (intermitttent). Negative for wheezing. Cardiovascular: Negative for chest pain, palpitations, orthopnea, leg swelling and PND. Gastrointestinal: Positive for blood in stool (occas). Negative for abdominal pain, constipation, diarrhea, heartburn, melena and nausea. Musculoskeletal: Negative for myalgias. Neurological: Negative for dizziness and headaches. Psychiatric/Behavioral: The patient is nervous/anxious. Physical Exam  Vitals signs and nursing note reviewed. Constitutional:       Appearance: She is well-developed. HENT:      Head: Normocephalic and atraumatic. Neck:      Musculoskeletal: Normal range of motion and neck supple. Thyroid: No thyromegaly. Vascular: No carotid bruit. Cardiovascular:      Rate and Rhythm: Normal rate and regular rhythm. Heart sounds: Normal heart sounds, S1 normal and S2 normal. No murmur. Pulmonary:      Effort: Pulmonary effort is normal. No respiratory distress. Breath sounds: Normal breath sounds. No wheezing or rales. Musculoskeletal:      Right lower leg: No edema. Left lower leg: No edema. Neurological:      Mental Status: She is alert and oriented to person, place, and time. Psychiatric:         Behavior: Behavior normal.         ASSESSMENT and PLAN  Diagnoses and all orders for this visit:    1. Moderate persistent asthma without complication  -     fluticasone furoate-vilanteroL (Breo Ellipta) 100-25 mcg/dose inhaler; Take 1 Puff by inhalation daily. -     albuterol (PROVENTIL HFA, VENTOLIN HFA, PROAIR HFA) 90 mcg/actuation inhaler; Take 1 Puff by inhalation every six (6) hours as needed for Wheezing. 2. HTN, goal below 130/80  -     losartan (Cozaar) 50 mg tablet; 2 po every day brand necessary medically  -     metoprolol succinate (Toprol XL) 50 mg XL tablet; toprol brand medically necessary    3. Prediabetes    4. Paroxysmal atrial fibrillation (HCC)    5. BRBPR (bright red blood per rectum)    6. Boil  -     mupirocin calcium (BACTROBAN) 2 % topical cream; Apply  to affected area two (2) times a day. the following changes in treatment are made: start preventive long acting inhaler breo  Refer to gi for brbpr intermittent  Encouraged her to discuss with cardiology her ?  Of using lower dose eliquis  aoppt here in 4mo

## 2020-07-08 ENCOUNTER — HOSPITAL ENCOUNTER (OUTPATIENT)
Dept: LAB | Age: 75
Discharge: HOME | End: 2020-07-08

## 2020-07-08 ENCOUNTER — OFFICE VISIT (OUTPATIENT)
Dept: INTERNAL MEDICINE CLINIC | Age: 75
End: 2020-07-08

## 2020-07-08 VITALS
RESPIRATION RATE: 18 BRPM | HEART RATE: 80 BPM | SYSTOLIC BLOOD PRESSURE: 128 MMHG | DIASTOLIC BLOOD PRESSURE: 75 MMHG | WEIGHT: 162 LBS | BODY MASS INDEX: 26.03 KG/M2 | OXYGEN SATURATION: 95 % | HEIGHT: 66 IN

## 2020-07-08 DIAGNOSIS — N89.8 VAGINAL ITCHING: Primary | ICD-10-CM

## 2020-07-08 DIAGNOSIS — N76.0 ACUTE VAGINITIS: ICD-10-CM

## 2020-07-08 DIAGNOSIS — N89.8 VAGINAL ITCHING: ICD-10-CM

## 2020-07-08 DIAGNOSIS — I10 HTN, GOAL BELOW 130/80: ICD-10-CM

## 2020-07-08 RX ORDER — TOBRAMYCIN/DEXAMETHASONE 0.3 %-0.1%
OINTMENT (GRAM) OPHTHALMIC (EYE)
COMMUNITY
Start: 2020-06-08 | End: 2022-05-10

## 2020-07-08 RX ORDER — ESTRADIOL 10 UG/1
10 INSERT VAGINAL
Qty: 24 TAB | Refills: 1 | Status: SHIPPED | OUTPATIENT
Start: 2020-07-09 | End: 2021-02-23 | Stop reason: ALTCHOICE

## 2020-07-08 NOTE — PROGRESS NOTES
Room:     Identified pt with two pt identifiers(name and ). Reviewed record in preparation for visit and have obtained necessary documentation. All patient medications has been reviewed. Chief Complaint   Patient presents with    Yeast Infection       Health Maintenance Due   Topic    DTaP/Tdap/Td series (1 - Tdap)    GLAUCOMA SCREENING Q2Y        There were no vitals filed for this visit. 1.Have you traveled outside of the [address] [place] [address] in the last month No    2. Have you been in close contact with someone who is suspected to have COVID-19 or has tested positive. No    3. Do you have any signs or symptoms. ? 4. Have you been to the ER, urgent care clinic since your last visit? Hospitalized since your last visit? No    5. Have you seen or consulted any other health care providers outside of the [facility] since your last visit? Include any pap smears or colon screening. No    6. Would you like to receive your flu shot today? NO    7. Are you fasting for blood work today? NO    8. Do you have an Advanced Directive/ Living Will in place? NO  If yes, do we have a copy on file NO  If no, would you like information NO    Patient is accompanied by self I have received verbal consent from Mariluz Roman to discuss any/all medical information while they are present in the room.

## 2020-07-08 NOTE — PROGRESS NOTES
HISTORY OF PRESENT ILLNESS  Martin Guevara is a 66 y.o. female. HPI  She describes roughly a week of vaginal discomfort, burning, just at rest.  No odor to urine, no flank pain, fevers, chills, or vomiting. She is worried about UTI, noting she has had persistent UTIs in the past.  We did call in Diflucan. She is not seeing any vaginal discharge. She will be traveling and worried about traveling with the burning discomfort. She has been giving Premarin cream in the past but has not used any recently. Review of Systems   Constitutional: Negative for chills, diaphoresis and fever. Gastrointestinal: Negative for abdominal pain, nausea and vomiting. Genitourinary: Positive for dysuria. Negative for flank pain, frequency, hematuria and urgency. Physical Exam  Vitals signs and nursing note reviewed. Constitutional:       Appearance: Normal appearance. Abdominal:      General: Bowel sounds are normal.      Palpations: Abdomen is soft. Tenderness: There is no abdominal tenderness. Genitourinary:     Comments: Ext genitalia normal no lesions but very sensitive to touch winces when I touch  Her labia majora bilaterally vaginal vault well visualized and no discharge seen swab t aken    Neurological:      General: No focal deficit present. Mental Status: She is alert and oriented to person, place, and time. Psychiatric:         Behavior: Behavior normal.         ASSESSMENT and PLAN  Diagnoses and all orders for this visit:    1. Vaginal itching-I suspect her burning in vaginal area is not from bacterial uti or from infection in vagina but rather from atrophic vaginitis   Will start vagifem and await cultures  -     CULTURE, URINE; Future  -     NUSWAB VAGINITIS + HSV; Future    2. Acute vaginitis  -     estradioL (VAGIFEM) 10 mcg tab vaginal tablet; Insert 1 Tab into vagina every Monday and Thursday.  -     CULTURE, URINE; Future  -     NUSWAB VAGINITIS + HSV; Future    3.  HTN, goal below 130/80  stable

## 2020-07-09 LAB
BACTERIA SPEC CULT: NORMAL
CC UR VC: NORMAL
SERVICE CMNT-IMP: NORMAL

## 2020-07-10 ENCOUNTER — TELEPHONE (OUTPATIENT)
Dept: INTERNAL MEDICINE CLINIC | Age: 75
End: 2020-07-10

## 2020-07-10 NOTE — TELEPHONE ENCOUNTER
Spoke with patient and advised her of urine culture results. Pt stated that Dr. Sarai Anderson gave her a rx for estrogen and she noticed that it said not to take it if you have a lump in your breast.  Pt states that she does have a lump in her breast that she has had drained in the past and was told it was a benign cyst.  She has an appt with a breast specialist soon but wanted to make sure it was ok to start taking the estrogen.

## 2020-07-10 NOTE — TELEPHONE ENCOUNTER
----- Message from Cuca Rees MD sent at 7/10/2020  7:30 AM EDT -----  Notify no sign of uti just some normal vaginal jaz seen -no  Need for abx for this

## 2020-07-15 LAB
A VAGINAE DNA VAG QL NAA+PROBE: ABNORMAL SCORE
BVAB2 DNA VAG QL NAA+PROBE: ABNORMAL SCORE
C ALBICANS DNA VAG QL NAA+PROBE: POSITIVE
C GLABRATA DNA VAG QL NAA+PROBE: NEGATIVE
C TRACH RRNA SPEC QL NAA+PROBE: NEGATIVE
HSV1 DNA SPEC QL NAA+PROBE: NEGATIVE
HSV2 DNA SPEC QL NAA+PROBE: NEGATIVE
MEGA1 DNA VAG QL NAA+PROBE: ABNORMAL SCORE
N GONORRHOEA RRNA SPEC QL NAA+PROBE: NEGATIVE
SPECIMEN SOURCE: ABNORMAL
T VAGINALIS RRNA SPEC QL NAA+PROBE: NEGATIVE

## 2020-07-16 NOTE — PROGRESS NOTES
Notify swab did show yeast and the diflucan should cover this no other infection seen no bacteria-use the estrogen cream as ordered

## 2020-07-16 NOTE — PROGRESS NOTES
Patient notified of nuswab results confirming a yeast infection. Reassured she does not have a bacterial infection. Advised the diflucan that was prescribed will cover the infection & also advised she use the Vagifem as directed as well.

## 2020-08-06 ENCOUNTER — TELEPHONE (OUTPATIENT)
Dept: INTERNAL MEDICINE CLINIC | Age: 75
End: 2020-08-06

## 2020-08-06 ENCOUNTER — TELEPHONE (OUTPATIENT)
Dept: CARDIOLOGY CLINIC | Age: 75
End: 2020-08-06

## 2020-08-06 NOTE — TELEPHONE ENCOUNTER
Patient stated that she will be having extensive dental work coming up and is calling to see if she needs to be premedicated. Please advise.     Phone #: [phone]  Thanks

## 2020-08-06 NOTE — TELEPHONE ENCOUNTER
PTIDX2 instructed no premedication is needed for dental work [facility].   Patient verbalized understanding

## 2020-08-06 NOTE — TELEPHONE ENCOUNTER
Pt has an upcoming dental appt to have bridge removed that includes 3 teeth and then rebuilt. The dentist advised this will take several appts to get the bridge out. Previously she has been required to take Amoxicillin 4 caps 1 hr prior to appt and then 2 caps after 6 hrs. Would like a phone call to advise, she can be reached on her cell phone until 1 and then her home number [phone].     Preferred pharmacy [facility] [phone]

## 2020-08-06 NOTE — TELEPHONE ENCOUNTER
No indication for dental work abx  Prophylaxis-let her know the guidelines are revised because risk of antibiotic is greater than the benefit for her medical conditions so no need to use abx thanks

## 2020-08-06 NOTE — TELEPHONE ENCOUNTER
Notified patient PCP advises there is no indication for prophylactic abx treatment prior needed based off her health conditions. Explained the guidelines have changed. Patient stated her last primary dr was the one who started this regimen due to her irregular heartbeat. Discussed the guidelines have changed. Patient states she will verify with her cardiologist but was thankful for the info & the call back.

## 2020-08-12 ENCOUNTER — TELEPHONE (OUTPATIENT)
Dept: INTERNAL MEDICINE CLINIC | Age: 75
End: 2020-08-12

## 2020-08-12 RX ORDER — AMOXICILLIN 500 MG/1
2000 CAPSULE ORAL DAILY
Qty: 4 CAP | Refills: 1 | Status: SHIPPED | OUTPATIENT
Start: 2020-08-12

## 2020-08-12 NOTE — PROGRESS NOTES
Please notify her that this dosing of antibiotic is not what I recommend as it does carry risk of diarrhea from such high dose and is no longer advised-I did send in 2000 mg to take  1  Hour  Prior to her dental work as a premedication to make her oral surgeon happy but if he wants more abx he will need to order this -again this is no longer the advice we are giving but I amm willing to given her a premed 1 hour prior to dental work only thanks

## 2020-08-12 NOTE — TELEPHONE ENCOUNTER
Patient is requesting to speak with the nurse tp discuss the [address] [place] [address] PCP recommended her to , she can be reached at [phone]

## 2020-08-12 NOTE — TELEPHONE ENCOUNTER
Spoke with patient & provided PCP GYN recommendation to patient: Dr. Anup Sargent at [facility] (ph# [phone]) w/location across from [facility]. Patient verbalized appreciation of info. Patient shared that she had a mesh screen placed in her past by a uro-gynecologist when she lived in [place], [place] & she feels like this needs follow-up now. Patient will call Dr. Atkinson Comp office to make an appointment & keep PCP updated on the outcome. Patient requesting a prescription for Amoxicillin from PCP. Patient reported that next week on Thursday August 20, 2020, she will be going to an oral surgeon b/c she requires many consecutive dental procedures to have a total bridge removed, rebuilt & replaced, along with 3 crowns. Patient added that her oral surgeon is Dr. Sabrina Linton. Patient stated that due to her artificial knee, the oral surgeon will not perform the procedures unless the patient has antibiotics on board. Patient has been instructed to take 2000mg of Amoxicillin one hour before each dental procedure & another 1000mg of Amoxicillin 6 hours after the initial Amoxicillin dose. Patient was told that she needed to obtain the Amoxicillin from her PCP. Nurse will forward request to PCP. Patient provided new pharmacy information: [facility] on [address] # [phone] & store# [id number]. Pharmacy info entered into patient's chart by nurse. Patient verbalized appreciation of assistance.

## 2020-08-12 NOTE — TELEPHONE ENCOUNTER
Spoke with patient & informed her that PCP sent in four 500mg capsules of Amoxicillin to [facility], so she will have this to take prior to her first dental procedure. Nurse informed patient that PCP was not in agreement with the additional 1000mg of Amoxicillin 6 hours after the first dose, so oral surgeon would have to fill any additional Amoxicillin prescriptions that the patient needs. Patient verbalized understanding & she will confirm the additional doses with the oral surgeon at her first appointment next Thursday 8/20/20. Nurse encouraged patient to take the 2000mg Amoxicillin dose with food & for patient to consider taking a pro-biotic. Patient verbalized appreciation of this information as she has IBS, so she understands how damaging this much Amoxicillin at one time can be to the stomach as diarrhea may follow. Nurse asked patient to call office & update PCP with the oral surgery plan after her first appointment & patient verbalized agreement to do so.
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
## 2020-09-14 ENCOUNTER — OFFICE VISIT (OUTPATIENT)
Dept: CARDIOLOGY CLINIC | Age: 75
End: 2020-09-14
Payer: MEDICARE

## 2020-09-14 VITALS
WEIGHT: 165 LBS | DIASTOLIC BLOOD PRESSURE: 90 MMHG | SYSTOLIC BLOOD PRESSURE: 160 MMHG | HEIGHT: 66 IN | BODY MASS INDEX: 26.52 KG/M2 | OXYGEN SATURATION: 96 % | HEART RATE: 64 BPM

## 2020-09-14 DIAGNOSIS — I10 HTN, GOAL BELOW 130/80: ICD-10-CM

## 2020-09-14 DIAGNOSIS — I65.23 BILATERAL CAROTID ARTERY STENOSIS: ICD-10-CM

## 2020-09-14 DIAGNOSIS — E78.2 MIXED HYPERLIPIDEMIA: ICD-10-CM

## 2020-09-14 DIAGNOSIS — I48.0 PAROXYSMAL ATRIAL FIBRILLATION (HCC): Primary | ICD-10-CM

## 2020-09-14 DIAGNOSIS — I10 ESSENTIAL HYPERTENSION: ICD-10-CM

## 2020-09-14 DIAGNOSIS — R73.03 PREDIABETES: ICD-10-CM

## 2020-09-14 PROCEDURE — G8753 SYS BP > OR = 140: HCPCS | Performed by: SPECIALIST

## 2020-09-14 PROCEDURE — G8432 DEP SCR NOT DOC, RNG: HCPCS | Performed by: SPECIALIST

## 2020-09-14 PROCEDURE — 99214 OFFICE O/P EST MOD 30 MIN: CPT | Performed by: SPECIALIST

## 2020-09-14 PROCEDURE — G8419 CALC BMI OUT NRM PARAM NOF/U: HCPCS | Performed by: SPECIALIST

## 2020-09-14 PROCEDURE — 1090F PRES/ABSN URINE INCON ASSESS: CPT | Performed by: SPECIALIST

## 2020-09-14 PROCEDURE — 1101F PT FALLS ASSESS-DOCD LE1/YR: CPT | Performed by: SPECIALIST

## 2020-09-14 PROCEDURE — G0463 HOSPITAL OUTPT CLINIC VISIT: HCPCS | Performed by: SPECIALIST

## 2020-09-14 PROCEDURE — G8755 DIAS BP > OR = 90: HCPCS | Performed by: SPECIALIST

## 2020-09-14 PROCEDURE — G8536 NO DOC ELDER MAL SCRN: HCPCS | Performed by: SPECIALIST

## 2020-09-14 PROCEDURE — G8427 DOCREV CUR MEDS BY ELIG CLIN: HCPCS | Performed by: SPECIALIST

## 2020-09-14 PROCEDURE — G8399 PT W/DXA RESULTS DOCUMENT: HCPCS | Performed by: SPECIALIST

## 2020-09-14 RX ORDER — LOSARTAN POTASSIUM 50 MG/1
100 TABLET ORAL DAILY
Qty: 180 TAB | Refills: 3 | Status: SHIPPED | OUTPATIENT
Start: 2020-09-14 | End: 2020-10-15 | Stop reason: ALTCHOICE

## 2020-09-14 RX ORDER — LOSARTAN POTASSIUM 50 MG/1
TABLET ORAL
Qty: 180 TAB | Refills: 2 | Status: CANCELLED | OUTPATIENT
Start: 2020-09-14

## 2020-09-14 RX ORDER — METOPROLOL SUCCINATE 50 MG/1
TABLET, EXTENDED RELEASE ORAL
Qty: 90 TAB | Refills: 3 | Status: SHIPPED | OUTPATIENT
Start: 2020-09-14 | End: 2020-10-12 | Stop reason: SDUPTHER

## 2020-09-14 NOTE — LETTER
9/14/20 Patient: Nichole Mejia YOB: 1945 Date of Visit: 9/14/2020 Lakesha Lopez MD 
[address] [facility], [facility] [address] [id number] VIA In Basket Dear Lakesha Lopez MD, Thank you for referring Ms. Nichole Mejia to [facility] for evaluation. My notes for this consultation are attached. If you have questions, please do not hesitate to call me. I look forward to following your patient along with you. Sincerely, Sylvia Guerrero MD

## 2020-09-14 NOTE — PATIENT INSTRUCTIONS
- Please be mindful of the salt in your diet - Please keep a log of your blood pressure and bring into your next visit.

## 2020-09-14 NOTE — PROGRESS NOTES
Sabina Claudio is a 66 y.o. female    Visit Vitals  BP (!) 160/90 (BP 1 Location: Left arm, BP Patient Position: Sitting)   Pulse 64   Ht 5' 6\" (1.676 m)   Wt 165 lb (74.8 kg)   SpO2 96%   BMI 26.63 kg/m²       Chief Complaint   Patient presents with    Irregular Heart Beat     afib    Hypertension    Carotid Artery Stenosis       Chest pain no  SOB no    Dizziness yes    Swelling no  Recent hospital visit no  Refills no

## 2020-10-08 ENCOUNTER — TELEPHONE (OUTPATIENT)
Dept: CARDIOLOGY CLINIC | Age: 75
End: 2020-10-08

## 2020-10-08 NOTE — TELEPHONE ENCOUNTER
Instructed patient that recommendation to hold 2-3 days will be faxed to surgeon.     Patient verbalized understanding    Surgeon name Jan Li

## 2020-10-08 NOTE — TELEPHONE ENCOUNTER
Pt requesting to stop eliquis on 10/11 & 10/12 for a procedure on 10/13 with [facility] Pt would like a call from Josué Rucker to indicate if she can hold the medication.  Please advise    [facility]:[phone]  Fax:[phone]        [facility]:[phone]

## 2020-10-12 DIAGNOSIS — I10 HTN, GOAL BELOW 130/80: ICD-10-CM

## 2020-10-12 RX ORDER — METOPROLOL SUCCINATE 50 MG/1
TABLET, EXTENDED RELEASE ORAL
Qty: 90 TAB | Refills: 3 | Status: SHIPPED | OUTPATIENT
Start: 2020-10-12

## 2020-10-15 ENCOUNTER — OFFICE VISIT (OUTPATIENT)
Dept: INTERNAL MEDICINE CLINIC | Age: 75
End: 2020-10-15
Payer: MEDICARE

## 2020-10-15 VITALS
SYSTOLIC BLOOD PRESSURE: 150 MMHG | HEART RATE: 62 BPM | HEIGHT: 66 IN | DIASTOLIC BLOOD PRESSURE: 80 MMHG | RESPIRATION RATE: 16 BRPM | BODY MASS INDEX: 26.52 KG/M2 | WEIGHT: 165 LBS | OXYGEN SATURATION: 97 %

## 2020-10-15 DIAGNOSIS — I48.0 PAROXYSMAL ATRIAL FIBRILLATION (HCC): ICD-10-CM

## 2020-10-15 DIAGNOSIS — L02.92 BOIL: ICD-10-CM

## 2020-10-15 DIAGNOSIS — I10 HTN, GOAL BELOW 130/80: Primary | ICD-10-CM

## 2020-10-15 DIAGNOSIS — J47.9 BRONCHIECTASIS WITHOUT COMPLICATION (HCC): ICD-10-CM

## 2020-10-15 PROCEDURE — G8427 DOCREV CUR MEDS BY ELIG CLIN: HCPCS | Performed by: INTERNAL MEDICINE

## 2020-10-15 PROCEDURE — G8754 DIAS BP LESS 90: HCPCS | Performed by: INTERNAL MEDICINE

## 2020-10-15 PROCEDURE — 99214 OFFICE O/P EST MOD 30 MIN: CPT | Performed by: INTERNAL MEDICINE

## 2020-10-15 PROCEDURE — G0463 HOSPITAL OUTPT CLINIC VISIT: HCPCS | Performed by: INTERNAL MEDICINE

## 2020-10-15 PROCEDURE — G8753 SYS BP > OR = 140: HCPCS | Performed by: INTERNAL MEDICINE

## 2020-10-15 PROCEDURE — 1090F PRES/ABSN URINE INCON ASSESS: CPT | Performed by: INTERNAL MEDICINE

## 2020-10-15 PROCEDURE — 1101F PT FALLS ASSESS-DOCD LE1/YR: CPT | Performed by: INTERNAL MEDICINE

## 2020-10-15 PROCEDURE — G8399 PT W/DXA RESULTS DOCUMENT: HCPCS | Performed by: INTERNAL MEDICINE

## 2020-10-15 PROCEDURE — G8419 CALC BMI OUT NRM PARAM NOF/U: HCPCS | Performed by: INTERNAL MEDICINE

## 2020-10-15 PROCEDURE — G8510 SCR DEP NEG, NO PLAN REQD: HCPCS | Performed by: INTERNAL MEDICINE

## 2020-10-15 PROCEDURE — G8536 NO DOC ELDER MAL SCRN: HCPCS | Performed by: INTERNAL MEDICINE

## 2020-10-15 RX ORDER — OLMESARTAN MEDOXOMIL 40 MG/1
40 TABLET ORAL DAILY
Qty: 90 TAB | Refills: 1 | Status: SHIPPED | OUTPATIENT
Start: 2020-10-15 | End: 2021-02-23 | Stop reason: ALTCHOICE

## 2020-10-15 RX ORDER — MUPIROCIN CALCIUM 20 MG/G
CREAM TOPICAL 2 TIMES DAILY
Qty: 30 G | Refills: 0 | Status: SHIPPED | OUTPATIENT
Start: 2020-10-15 | End: 2021-06-18 | Stop reason: SDUPTHER

## 2020-10-15 NOTE — PROGRESS NOTES
HISTORY OF PRESENT ILLNESS  Luis Farnsworth is a 66 y.o. female. HPI  Seen at follow up. Issues:  1. Hypertension, currently on Losartan 100 mg, Toprol 25 mg, and has Hydralazine to use prn. Has taken it three times in the last month, the Hydralazine, which does make her tired. Blood pressure at home is in the 140-160 range. I have advised changing from Losartan to Olmesartan. She wants branded and I have explained that we do not have a medical reason for branded, but I will write it this way. 2. Paroxysmal a-fib, remains on Eliquis and Toprol. 3. GYN issues. Working with Dr. Monroe Sarmiento. Has had a steroid injection into a ligament in her pelvis, not sure yet if this is helping. 4. Last A1c was 5.9. She would like refill on Bactroban. Review of Systems   Constitutional: Positive for malaise/fatigue. Negative for chills, fever and weight loss. Respiratory: Positive for shortness of breath. Negative for cough, sputum production and wheezing. Cardiovascular: Negative for chest pain, palpitations, orthopnea, leg swelling and PND. Gastrointestinal: Negative for abdominal pain, heartburn, nausea and vomiting. Musculoskeletal: Negative for myalgias. Neurological: Negative for dizziness and headaches. Psychiatric/Behavioral: The patient is nervous/anxious. Physical Exam  Vitals signs and nursing note reviewed. Constitutional:       Appearance: She is well-developed. HENT:      Head: Normocephalic and atraumatic. Neck:      Musculoskeletal: Normal range of motion and neck supple. Thyroid: No thyromegaly. Vascular: No carotid bruit. Cardiovascular:      Rate and Rhythm: Normal rate and regular rhythm. Heart sounds: Normal heart sounds, S1 normal and S2 normal. No murmur. Pulmonary:      Effort: Pulmonary effort is normal. No respiratory distress. Breath sounds: Normal breath sounds. No wheezing or rales. Musculoskeletal:      Right lower leg: No edema. Left lower leg: No edema. Neurological:      Mental Status: She is alert and oriented to person, place, and time. Psychiatric:         Behavior: Behavior normal.         ASSESSMENT and PLAN  Diagnoses and all orders for this visit:    1. HTN, goal below 130/80-not fully controlled suggest change from losartan to olmesartan she prefers branded but is aware that I do not have a medical reason to require this and insurance may not cover the brand I do advise change from losartan  appt in 3-4 mo  -     olmesartan (BENICAR) 40 mg tablet; Take 1 Tab by mouth daily. Brand only    2. Paroxysmal atrial fibrillation (HCC)-cont eliquis and toprol    3. Boil  -     mupirocin calcium (BACTROBAN) 2 % topical cream; Apply  to affected area two (2) times a day. Topically every day prn brand only    4.  Bronchiectasis without complication ([facility] [address]  -     REFERRAL TO PULMONARY DISEASE

## 2021-01-28 ENCOUNTER — TELEPHONE (OUTPATIENT)
Dept: INTERNAL MEDICINE CLINIC | Age: 76
End: 2021-01-28

## 2021-01-28 NOTE — TELEPHONE ENCOUNTER
----- Message from Bobbi Mckeon sent at 1/28/2021  8:42 AM EST -----  Regarding: Dr. Alyx Smith: [phone]  General Message/Vendor Calls    Caller's first and last name: Pt.      Reason for call: Had Covid shot, was given antibiotic, unsure if she can take it. Callback required yes/no and why: Yes, confirmation on antibiotic. Best contact number(s): [phone]      Details to clarify the request: Pt received Covid shot yesterday, was given antibiotic by dentist. Is unsure if it is ok to take the antibiotic after having the Covid shot. Would like a call back to confirm if she can or cannot take it. If no answer may call [phone].       Bobbi Mckeon

## 2021-01-28 NOTE — TELEPHONE ENCOUNTER
Patient made aware can start the keflex but does need to hold the doxycycline until the keflex course is complete. Patient states she already takes daily probiotic. Patient had no further questions or concerns at this time.

## 2021-02-23 ENCOUNTER — OFFICE VISIT (OUTPATIENT)
Dept: INTERNAL MEDICINE CLINIC | Age: 76
End: 2021-02-23
Payer: MEDICARE

## 2021-02-23 VITALS
DIASTOLIC BLOOD PRESSURE: 76 MMHG | BODY MASS INDEX: 26.68 KG/M2 | HEIGHT: 66 IN | RESPIRATION RATE: 18 BRPM | OXYGEN SATURATION: 97 % | HEART RATE: 73 BPM | SYSTOLIC BLOOD PRESSURE: 132 MMHG | WEIGHT: 166 LBS | TEMPERATURE: 98.2 F

## 2021-02-23 DIAGNOSIS — I10 HTN, GOAL BELOW 130/80: Primary | ICD-10-CM

## 2021-02-23 DIAGNOSIS — J47.9 BRONCHIECTASIS WITHOUT COMPLICATION (HCC): ICD-10-CM

## 2021-02-23 DIAGNOSIS — I48.0 PAROXYSMAL ATRIAL FIBRILLATION (HCC): ICD-10-CM

## 2021-02-23 DIAGNOSIS — R73.03 PREDIABETES: ICD-10-CM

## 2021-02-23 LAB
ALBUMIN SERPL-MCNC: 3.7 G/DL (ref 3.5–5)
ALBUMIN/GLOB SERPL: 1.1 {RATIO} (ref 1.1–2.2)
ALP SERPL-CCNC: 71 U/L (ref 45–117)
ALT SERPL-CCNC: 30 U/L (ref 12–78)
ANION GAP SERPL CALC-SCNC: 6 MMOL/L (ref 5–15)
AST SERPL-CCNC: 15 U/L (ref 15–37)
BASOPHILS # BLD: 0 K/UL (ref 0–0.1)
BASOPHILS NFR BLD: 1 % (ref 0–1)
BILIRUB SERPL-MCNC: 0.5 MG/DL (ref 0.2–1)
BUN SERPL-MCNC: 18 MG/DL (ref 6–20)
BUN/CREAT SERPL: 24 (ref 12–20)
CALCIUM SERPL-MCNC: 9.3 MG/DL (ref 8.5–10.1)
CHLORIDE SERPL-SCNC: 101 MMOL/L (ref 97–108)
CO2 SERPL-SCNC: 31 MMOL/L (ref 21–32)
CREAT SERPL-MCNC: 0.74 MG/DL (ref 0.55–1.02)
DIFFERENTIAL METHOD BLD: NORMAL
EOSINOPHIL # BLD: 0.1 K/UL (ref 0–0.4)
EOSINOPHIL NFR BLD: 2 % (ref 0–7)
ERYTHROCYTE [DISTWIDTH] IN BLOOD BY AUTOMATED COUNT: 12.6 % (ref 11.5–14.5)
GLOBULIN SER CALC-MCNC: 3.5 G/DL (ref 2–4)
GLUCOSE SERPL-MCNC: 82 MG/DL (ref 65–100)
HCT VFR BLD AUTO: 42.3 % (ref 35–47)
HGB BLD-MCNC: 13.5 G/DL (ref 11.5–16)
IMM GRANULOCYTES # BLD AUTO: 0 K/UL (ref 0–0.04)
IMM GRANULOCYTES NFR BLD AUTO: 0 % (ref 0–0.5)
LYMPHOCYTES # BLD: 2 K/UL (ref 0.8–3.5)
LYMPHOCYTES NFR BLD: 31 % (ref 12–49)
MCH RBC QN AUTO: 30.5 PG (ref 26–34)
MCHC RBC AUTO-ENTMCNC: 31.9 G/DL (ref 30–36.5)
MCV RBC AUTO: 95.5 FL (ref 80–99)
MONOCYTES # BLD: 0.6 K/UL (ref 0–1)
MONOCYTES NFR BLD: 9 % (ref 5–13)
NEUTS SEG # BLD: 3.8 K/UL (ref 1.8–8)
NEUTS SEG NFR BLD: 57 % (ref 32–75)
NRBC # BLD: 0 K/UL (ref 0–0.01)
NRBC BLD-RTO: 0 PER 100 WBC
PLATELET # BLD AUTO: 289 K/UL (ref 150–400)
PMV BLD AUTO: 10.7 FL (ref 8.9–12.9)
POTASSIUM SERPL-SCNC: 4.5 MMOL/L (ref 3.5–5.1)
PROT SERPL-MCNC: 7.2 G/DL (ref 6.4–8.2)
RBC # BLD AUTO: 4.43 M/UL (ref 3.8–5.2)
SODIUM SERPL-SCNC: 138 MMOL/L (ref 136–145)
WBC # BLD AUTO: 6.5 K/UL (ref 3.6–11)

## 2021-02-23 PROCEDURE — 1101F PT FALLS ASSESS-DOCD LE1/YR: CPT | Performed by: INTERNAL MEDICINE

## 2021-02-23 PROCEDURE — G8510 SCR DEP NEG, NO PLAN REQD: HCPCS | Performed by: INTERNAL MEDICINE

## 2021-02-23 PROCEDURE — G8419 CALC BMI OUT NRM PARAM NOF/U: HCPCS | Performed by: INTERNAL MEDICINE

## 2021-02-23 PROCEDURE — 1090F PRES/ABSN URINE INCON ASSESS: CPT | Performed by: INTERNAL MEDICINE

## 2021-02-23 PROCEDURE — G8752 SYS BP LESS 140: HCPCS | Performed by: INTERNAL MEDICINE

## 2021-02-23 PROCEDURE — G8536 NO DOC ELDER MAL SCRN: HCPCS | Performed by: INTERNAL MEDICINE

## 2021-02-23 PROCEDURE — 99214 OFFICE O/P EST MOD 30 MIN: CPT | Performed by: INTERNAL MEDICINE

## 2021-02-23 PROCEDURE — G8754 DIAS BP LESS 90: HCPCS | Performed by: INTERNAL MEDICINE

## 2021-02-23 PROCEDURE — G8427 DOCREV CUR MEDS BY ELIG CLIN: HCPCS | Performed by: INTERNAL MEDICINE

## 2021-02-23 PROCEDURE — G8399 PT W/DXA RESULTS DOCUMENT: HCPCS | Performed by: INTERNAL MEDICINE

## 2021-02-23 PROCEDURE — G0463 HOSPITAL OUTPT CLINIC VISIT: HCPCS | Performed by: INTERNAL MEDICINE

## 2021-02-23 RX ORDER — AZITHROMYCIN MONOHYDRATE 10 MG/ML
1 SOLUTION/ DROPS OPHTHALMIC 2 TIMES DAILY
COMMUNITY
End: 2021-06-18 | Stop reason: ALTCHOICE

## 2021-02-23 RX ORDER — APIXABAN 5 MG/1
TABLET, FILM COATED ORAL
Qty: 180 TAB | Refills: 0 | Status: SHIPPED | OUTPATIENT
Start: 2021-02-23 | End: 2021-11-02 | Stop reason: SDUPTHER

## 2021-02-23 RX ORDER — LOSARTAN POTASSIUM 100 MG/1
100 TABLET ORAL DAILY
COMMUNITY
End: 2021-11-02 | Stop reason: DRUGHIGH

## 2021-02-23 NOTE — PROGRESS NOTES
HISTORY OF PRESENT ILLNESS  Grace Ramirez is a 66 y.o. female. HPI  Follow up for hypertension, currently on Losartan, Toprol and Hydralazine. She is seeing Dr. Juliana Charlton, who ordered an event monitor, but apparently she could not wear it because of latex allergies. She asks for my help with Eliquis, on this for a-fib. At home blood pressure is ranging 132-150/70-89. Discussed changing Losartan to Olmesartan. She declines for now, but will consider in three months. Atrial fibrillation. No shortness of breath or palpitations. Again, working with Dr. Juliana Charlton about rate. Fatigue. No evidence of bleeding. She will get her second COVID vaccine this week. Review of Systems   Constitutional: Positive for malaise/fatigue. Negative for chills, fever and weight loss. Respiratory: Negative for cough, shortness of breath and wheezing. Cardiovascular: Negative for chest pain, palpitations, orthopnea, leg swelling and PND. Gastrointestinal: Negative for blood in stool, constipation, heartburn and nausea. Musculoskeletal: Negative for myalgias. Neurological: Negative for dizziness and headaches. Physical Exam  Vitals signs and nursing note reviewed. Constitutional:       Appearance: She is well-developed. HENT:      Head: Normocephalic and atraumatic. Neck:      Musculoskeletal: Normal range of motion and neck supple. Thyroid: No thyromegaly. Vascular: No carotid bruit. Cardiovascular:      Rate and Rhythm: Normal rate. Rhythm irregular. Heart sounds: Normal heart sounds, S1 normal and S2 normal. No murmur. Pulmonary:      Effort: Pulmonary effort is normal. No respiratory distress. Breath sounds: Normal breath sounds. No wheezing or rales. Musculoskeletal:      Right lower leg: No edema. Left lower leg: No edema. Neurological:      Mental Status: She is alert and oriented to person, place, and time.    Psychiatric:         Behavior: Behavior normal. ASSESSMENT and PLAN  Diagnoses and all orders for this visit:    1. HTN, goal below 130/80-not fully controlled at  Home -advised change losartan to benicar-she wants to consider in 3 mo  -     METABOLIC PANEL, COMPREHENSIVE; Future    2. Paroxysmal atrial fibrillation (HCC)  -     Eliquis 5 mg tablet; TAKE 1 TABLET TWICE A DAY    3. Bronchiectasis without complication (HCC)  -     CBC WITH AUTOMATED DIFF; Future    4.  Prediabetes  Con to see dr Omega Simmonds

## 2021-03-01 ENCOUNTER — TELEPHONE (OUTPATIENT)
Dept: INTERNAL MEDICINE CLINIC | Age: 76
End: 2021-03-01

## 2021-03-01 NOTE — TELEPHONE ENCOUNTER
----- Message from Waleska Edmundo sent at 3/1/2021  8:08 AM EST -----  Regarding: /telephone  Contact: [phone]  General Message/Vendor Calls    Caller's first and last name:N/A      Reason for call: She had a COVID vaccine last Wednesday, and is scheduled to have a cortisone injection to her knee tomorrow, she wanted to make sure that there were no interactions. Callback required yes/no and why: Yes      Best contact number(s): [phone]      Details to clarify the request: She is also having a cortisone injection near where her uterus was she has a mesh screen, she was just concerned getting 2 cortisone injections within 2 weeks of each other.       Waleska Wyatt

## 2021-03-08 ENCOUNTER — TELEPHONE (OUTPATIENT)
Dept: INTERNAL MEDICINE CLINIC | Age: 76
End: 2021-03-08

## 2021-03-08 NOTE — TELEPHONE ENCOUNTER
----- Message from Richard Bullock sent at 3/8/2021  2:16 PM EST -----  Regarding: Dr. Hanley Reach  [id number] Message/Vendor Calls    Caller's first and last name: Pt      Reason for call: Pt would like to discuss lab results      Callback required yes/no and why: Yes, to discuss      Best contact number(s): [phone] / [phone]      Details to clarify the request: n/a      Richard Bullock

## 2021-03-08 NOTE — TELEPHONE ENCOUNTER
Patient had concerns for her elevated bun/cr level. Reassured based off her cr level her kidneys are functioning fine. Encouraged patient to drink plenty of water to help flush her kidneys & PCP will continue to monitor her levels over time. Patient was thankful for the return call & had no further questions or concerns.

## 2021-03-10 ENCOUNTER — TELEPHONE (OUTPATIENT)
Dept: INTERNAL MEDICINE CLINIC | Age: 76
End: 2021-03-10

## 2021-03-10 DIAGNOSIS — R42 VERTIGO: Primary | ICD-10-CM

## 2021-03-10 NOTE — TELEPHONE ENCOUNTER
Patient reports waking up with a vertigo spell. She states she has had vertigo for years & also has neck issues as well which can make the vertigo worse. She has scheduled a PT visit with [facility]i for today to do some therapy & her daughter will be driving her. She denies headaches, acute vision changes, chest pain, sob, numbness & tingling & weakness. She would like the PT order faxed to [phone].

## 2021-03-10 NOTE — TELEPHONE ENCOUNTER
----- Message from Luh Francis sent at 3/10/2021  8:27 AM EST -----  Regarding: Dr. Danielito Mandujano  Level 1/Escalated Issue      Caller's first and last name and relationship (if not the patient):      Best contact number(s): [phone]      What are the symptoms: dizziness and can't focus      Transfer successful - yes/no (include outcome): No, No answer      Transfer declined - yes/no (include reason):       Was caller advised to seek appropriate level of care - yes/no: yes      Details to clarify the request: Pt experiencing dizziness and she is unable to focus        Luh Francis

## 2021-03-12 ENCOUNTER — VIRTUAL VISIT (OUTPATIENT)
Dept: INTERNAL MEDICINE CLINIC | Age: 76
End: 2021-03-12
Payer: MEDICARE

## 2021-03-12 DIAGNOSIS — M54.2 NECK PAIN: Primary | ICD-10-CM

## 2021-03-12 DIAGNOSIS — I10 HTN, GOAL BELOW 130/80: ICD-10-CM

## 2021-03-12 DIAGNOSIS — I48.0 PAROXYSMAL ATRIAL FIBRILLATION (HCC): ICD-10-CM

## 2021-03-12 DIAGNOSIS — R42 VERTIGO: ICD-10-CM

## 2021-03-12 PROCEDURE — 99442 PR PHYS/QHP TELEPHONE EVALUATION 11-20 MIN: CPT | Performed by: INTERNAL MEDICINE

## 2021-03-12 NOTE — PROGRESS NOTES
Lydia Cassidy (: 1945) is a 66 y.o. female, established patient, here for evaluation of the following chief complaint(s):   Dizziness (can't focus)       ASSESSMENT/PLAN:  1. Neck pain-with stiffness and can't fully turn head working with [facility] and wants referral for neck specifically for rom  -     REFERRAL TO PHYSICAL THERAPY  2. HTN, goal below 130/80  3. Vertigo-improving with vestibular pt  4. Paroxysmal atrial fibrillation (HCC)-on eliquis and ? Re getting steroid shot from dr Jigna Sanchez as he advised for her chronic pain assoc with mesh but would need to stop eliquis for 2 days-discussed pros and cons of steroid shots-will try to increase walking to see if helps pain      No follow-ups on file. SUBJECTIVE/OBJECTIVE:  HPI   Has stiff neck and can't turn head all the way to side  Feels vertigo is improving with pt  No cp    Review of Systems     Patient-Reported Vitals 3/12/2021   Patient-Reported Weight 165lb       Physical Exam        On this date 2021 I have spent 15 minutes reviewing previous notes, test results and face to face (virtual) with the patient discussing the diagnosis and importance of compliance with the treatment plan as well as documenting on the day of the visit. Lydia Cassidy, was evaluated through a synchronous (real-time) audio only. The patient (or guardian if applicable) is aware that this is a billable service. Verbal consent to proceed has been obtained within the past 12 months. The visit was conducted pursuant to the emergency declaration under the [facility], [address] [place] [address] authority and the [employer] General Act. Patient identification was verified, and a caregiver was present when appropriate. The patient was located in a state where the provider was credentialed to provide care. An electronic signature was used to authenticate this note.   -- Kristi Gibson MD

## 2021-03-15 ENCOUNTER — TELEPHONE (OUTPATIENT)
Dept: INTERNAL MEDICINE CLINIC | Age: 76
End: 2021-03-15

## 2021-03-15 NOTE — TELEPHONE ENCOUNTER
Per PCP's request, faxed 3/12/2021 Outpatient Physical Therapy order to [facility] (fax# [phone]). Fax result report shows successful transmission of 2 pages.

## 2021-05-11 NOTE — PATIENT DISCUSSION
Papilloma RUL: apppears benign OD - The patient has a lesion of the right upper eyelid which appears benign. Measurements were taken and observation at regular intervals was recommended. The patient was asked to report  if there is any growth. Research Consent Checklist    Protocol:  Study of Daratumumab/rHuPH20 +Lenalidomide or Lenalidomide as Post-Autologous Stem Cell Transplant Maintenance Therapy in Patient with MM usingg Minimal residual Diseased to direct therapy Duration NCT#[id number]  Consent version date: 12/23/2020  The following were discussed with the patient:      Purpose of the study - who are the participants:  YES  Study design - schedule of the trial:  YES  Schedule of study tests:  YES  Study specific questionnaires:  n/a  Potential side effects:  YES  All trial medication disclosed:  YES  Follow-up schedule after active treatment:  n/a  Confidentiality:  YES    Birth control discussed:  YES       female      Type of birth control to be used: n/a   Pregnancy while on the trial:  n/a  Voluntary participation / withdrawal:  YES  Alternative options to the clinical trial:  YES  Potential benefits of a clinical trial:  YES  The screening process:  YES  The randomization process:  yes  Re-consenting upon new findings:  YES  Blinding / un-blinding:  n/a  Research department phone numbers:  YES  Contacts for patient after hours / weekends:  YES  Contact research staff if hospitalized / emergency room visit:  YES  No payment for being in the study:  YES  No screening tests strictly for research performed prior to consent:  YES    All questions answered:  YES   was not required          name and language:  n/a  HIPAA information included in consent:  YES    Consent signed, dated and witnessed (if applicable) on 05/11/2021  Copy of consent given to patient:  YES  Original consent placed in research chart:  YES  Copy of consent scanned into patient's electronic medical record:  YES    The patient's spouse was present during Step 1 Observation pre Stem Cell transplant consenting process.  All questions answered. The patient is to call writer with any further questions or concerns.

## 2021-06-08 ENCOUNTER — HOSPITAL ENCOUNTER (OUTPATIENT)
Dept: CT IMAGING | Age: 76
Discharge: HOME | End: 2021-06-08
Attending: NURSE PRACTITIONER
Payer: MEDICARE

## 2021-06-08 ENCOUNTER — OFFICE VISIT (OUTPATIENT)
Dept: INTERNAL MEDICINE CLINIC | Age: 76
End: 2021-06-08
Attending: NURSE PRACTITIONER
Payer: MEDICARE

## 2021-06-08 ENCOUNTER — DOCUMENTATION ONLY (OUTPATIENT)
Dept: INTERNAL MEDICINE CLINIC | Age: 76
End: 2021-06-08

## 2021-06-08 ENCOUNTER — APPOINTMENT (OUTPATIENT)
Dept: CT IMAGING | Age: 76
End: 2021-06-08
Attending: NURSE PRACTITIONER
Payer: MEDICARE

## 2021-06-08 VITALS
HEIGHT: 66 IN | OXYGEN SATURATION: 98 % | WEIGHT: 166 LBS | SYSTOLIC BLOOD PRESSURE: 158 MMHG | HEART RATE: 64 BPM | BODY MASS INDEX: 26.68 KG/M2 | TEMPERATURE: 97.9 F | DIASTOLIC BLOOD PRESSURE: 90 MMHG | RESPIRATION RATE: 14 BRPM

## 2021-06-08 DIAGNOSIS — R10.31 RLQ ABDOMINAL PAIN: ICD-10-CM

## 2021-06-08 DIAGNOSIS — R30.0 BURNING WITH URINATION: Primary | ICD-10-CM

## 2021-06-08 DIAGNOSIS — Z87.440 HISTORY OF RECURRENT UTI (URINARY TRACT INFECTION): ICD-10-CM

## 2021-06-08 DIAGNOSIS — K59.00 CONSTIPATION, UNSPECIFIED CONSTIPATION TYPE: ICD-10-CM

## 2021-06-08 LAB
BILIRUB UR QL STRIP: NEGATIVE
CREAT BLD-MCNC: 0.8 MG/DL (ref 0.6–1.3)
GLUCOSE UR-MCNC: NEGATIVE MG/DL
KETONES P FAST UR STRIP-MCNC: NEGATIVE MG/DL
PH UR STRIP: 6.5 [PH] (ref 4.6–8)
PROT UR QL STRIP: NEGATIVE
SP GR UR STRIP: 1.01 (ref 1–1.03)
UA UROBILINOGEN AMB POC: NORMAL (ref 0.2–1)
URINALYSIS CLARITY POC: CLEAR
URINALYSIS COLOR POC: NORMAL
URINE BLOOD POC: NORMAL
URINE LEUKOCYTES POC: NEGATIVE
URINE NITRITES POC: NEGATIVE

## 2021-06-08 PROCEDURE — G8427 DOCREV CUR MEDS BY ELIG CLIN: HCPCS | Performed by: NURSE PRACTITIONER

## 2021-06-08 PROCEDURE — 81003 URINALYSIS AUTO W/O SCOPE: CPT | Performed by: NURSE PRACTITIONER

## 2021-06-08 PROCEDURE — 74011000636 HC RX REV CODE- 636: Performed by: RADIOLOGY

## 2021-06-08 PROCEDURE — G0463 HOSPITAL OUTPT CLINIC VISIT: HCPCS | Performed by: NURSE PRACTITIONER

## 2021-06-08 PROCEDURE — 82565 ASSAY OF CREATININE: CPT

## 2021-06-08 PROCEDURE — 74177 CT ABD & PELVIS W/CONTRAST: CPT

## 2021-06-08 PROCEDURE — 1090F PRES/ABSN URINE INCON ASSESS: CPT | Performed by: NURSE PRACTITIONER

## 2021-06-08 PROCEDURE — 99214 OFFICE O/P EST MOD 30 MIN: CPT | Performed by: NURSE PRACTITIONER

## 2021-06-08 PROCEDURE — G8753 SYS BP > OR = 140: HCPCS | Performed by: NURSE PRACTITIONER

## 2021-06-08 PROCEDURE — 1101F PT FALLS ASSESS-DOCD LE1/YR: CPT | Performed by: NURSE PRACTITIONER

## 2021-06-08 PROCEDURE — G8536 NO DOC ELDER MAL SCRN: HCPCS | Performed by: NURSE PRACTITIONER

## 2021-06-08 PROCEDURE — G8432 DEP SCR NOT DOC, RNG: HCPCS | Performed by: NURSE PRACTITIONER

## 2021-06-08 PROCEDURE — G8755 DIAS BP > OR = 90: HCPCS | Performed by: NURSE PRACTITIONER

## 2021-06-08 PROCEDURE — G8419 CALC BMI OUT NRM PARAM NOF/U: HCPCS | Performed by: NURSE PRACTITIONER

## 2021-06-08 PROCEDURE — G8399 PT W/DXA RESULTS DOCUMENT: HCPCS | Performed by: NURSE PRACTITIONER

## 2021-06-08 RX ORDER — DOXYCYCLINE 50 MG/1
50 CAPSULE ORAL 2 TIMES DAILY
COMMUNITY

## 2021-06-08 RX ADMIN — IOPAMIDOL 100 ML: 755 INJECTION, SOLUTION INTRAVENOUS at 19:20

## 2021-06-08 NOTE — PROGRESS NOTES
STAT CT scan schedule for [facility] today at 7pm, pt needs to arrive at 5pm. No solid foods after 3pm
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
## 2021-06-09 ENCOUNTER — TELEPHONE (OUTPATIENT)
Dept: INTERNAL MEDICINE CLINIC | Age: 76
End: 2021-06-09

## 2021-06-09 RX ORDER — NITROFURANTOIN 25; 75 MG/1; MG/1
100 CAPSULE ORAL 2 TIMES DAILY
Qty: 10 CAPSULE | Refills: 0 | Status: SHIPPED | OUTPATIENT
Start: 2021-06-09 | End: 2022-03-01

## 2021-06-09 RX ORDER — LACTULOSE 10 G/15ML
15 SOLUTION ORAL
Qty: 60 ML | Refills: 1 | Status: SHIPPED | OUTPATIENT
Start: 2021-06-09 | End: 2021-07-08 | Stop reason: ALTCHOICE

## 2021-06-09 NOTE — TELEPHONE ENCOUNTER
I spoke with patient to advise of NP's message. Pt would like to know what laxative she recommends? Most otc ones she has tried has caused abdominal pain. Pt would like abx sent to [facility] pharmacy.

## 2021-06-09 NOTE — PROGRESS NOTES
Please call patient -- CT scan negative for acute abnormality but she has a large amount of stool throughout her colon. Recommend taking a laxative. No signs of small or large intestine obstruction. I will send in a prescription for Macrobid for her bladder and we have sent out urine culture.   Will call her with urine culture results once available

## 2021-06-09 NOTE — TELEPHONE ENCOUNTER
----- Message from Moose Duque NP sent at 6/9/2021  9:07 AM EDT -----    ----- Message -----  From: Paulie Toro Results In  Sent: 6/8/2021  10:00 PM EDT  To: Moose Duque NP

## 2021-06-10 ENCOUNTER — TRANSCRIBE ORDER (OUTPATIENT)
Dept: NEUROLOGY | Age: 76
End: 2021-06-10

## 2021-06-10 ENCOUNTER — TELEPHONE (OUTPATIENT)
Dept: INTERNAL MEDICINE CLINIC | Age: 76
End: 2021-06-10

## 2021-06-10 LAB
BACTERIA SPEC CULT: NORMAL
SERVICE CMNT-IMP: NORMAL

## 2021-06-10 NOTE — TELEPHONE ENCOUNTER
----- Message from Emil Devi sent at 6/10/2021  7:50 AM EDT -----  Regarding: IRON/MULUGETA/TELEPHONE  Contact: [phone]  General Message/Vendor Calls    Caller's first and last name: Oscar Vivar       Reason for call: lab results      Callback required yes/no and why: Yes      Best contact number(s): [phone], after today [phone]      Details to clarify the request: Patient would like a callback to discuss lab results to determine if or not she should continue taking Macrobid.         Emil Devi

## 2021-06-10 NOTE — TELEPHONE ENCOUNTER
Urine culture just returned as negative growth; she can stop taking the Macrobid; she does not have a UTI

## 2021-06-17 RX ORDER — LACTULOSE 10 G/15ML
15 SOLUTION ORAL
Qty: 60 ML | Refills: 1 | OUTPATIENT
Start: 2021-06-17

## 2021-06-17 NOTE — TELEPHONE ENCOUNTER
Patient needs a refill as she is leaving shortly to fly out of town. Patient will be seen by Dr. Barbara Fernandes on Monday but states she needs a refill before her appointment. Patient also wants to talk about some pain she is experiencing. Patient states she will be available for a return call at 1pm today.

## 2021-06-18 ENCOUNTER — OFFICE VISIT (OUTPATIENT)
Dept: INTERNAL MEDICINE CLINIC | Age: 76
End: 2021-06-18
Payer: MEDICARE

## 2021-06-18 VITALS
DIASTOLIC BLOOD PRESSURE: 89 MMHG | HEART RATE: 70 BPM | BODY MASS INDEX: 26.74 KG/M2 | OXYGEN SATURATION: 95 % | HEIGHT: 66 IN | RESPIRATION RATE: 16 BRPM | WEIGHT: 166.4 LBS | SYSTOLIC BLOOD PRESSURE: 136 MMHG | TEMPERATURE: 97.7 F

## 2021-06-18 DIAGNOSIS — K59.09 OTHER CONSTIPATION: ICD-10-CM

## 2021-06-18 DIAGNOSIS — R10.31 RLQ ABDOMINAL PAIN: Primary | ICD-10-CM

## 2021-06-18 LAB
ALBUMIN SERPL-MCNC: 3.9 G/DL (ref 3.5–5)
ALBUMIN/GLOB SERPL: 1.1 {RATIO} (ref 1.1–2.2)
ALP SERPL-CCNC: 70 U/L (ref 45–117)
ALT SERPL-CCNC: 25 U/L (ref 12–78)
ANION GAP SERPL CALC-SCNC: 7 MMOL/L (ref 5–15)
AST SERPL-CCNC: 16 U/L (ref 15–37)
BASOPHILS # BLD: 0 K/UL (ref 0–0.1)
BASOPHILS NFR BLD: 0 % (ref 0–1)
BILIRUB SERPL-MCNC: 0.4 MG/DL (ref 0.2–1)
BUN SERPL-MCNC: 20 MG/DL (ref 6–20)
BUN/CREAT SERPL: 31 (ref 12–20)
CALCIUM SERPL-MCNC: 9.9 MG/DL (ref 8.5–10.1)
CHLORIDE SERPL-SCNC: 101 MMOL/L (ref 97–108)
CO2 SERPL-SCNC: 30 MMOL/L (ref 21–32)
CREAT SERPL-MCNC: 0.64 MG/DL (ref 0.55–1.02)
DIFFERENTIAL METHOD BLD: NORMAL
EOSINOPHIL # BLD: 0.1 K/UL (ref 0–0.4)
EOSINOPHIL NFR BLD: 1 % (ref 0–7)
ERYTHROCYTE [DISTWIDTH] IN BLOOD BY AUTOMATED COUNT: 13.2 % (ref 11.5–14.5)
GLOBULIN SER CALC-MCNC: 3.6 G/DL (ref 2–4)
GLUCOSE SERPL-MCNC: 85 MG/DL (ref 65–100)
HCT VFR BLD AUTO: 44.1 % (ref 35–47)
HGB BLD-MCNC: 13.8 G/DL (ref 11.5–16)
IMM GRANULOCYTES # BLD AUTO: 0 K/UL (ref 0–0.04)
IMM GRANULOCYTES NFR BLD AUTO: 0 % (ref 0–0.5)
LYMPHOCYTES # BLD: 1.9 K/UL (ref 0.8–3.5)
LYMPHOCYTES NFR BLD: 25 % (ref 12–49)
MCH RBC QN AUTO: 30.5 PG (ref 26–34)
MCHC RBC AUTO-ENTMCNC: 31.3 G/DL (ref 30–36.5)
MCV RBC AUTO: 97.4 FL (ref 80–99)
MONOCYTES # BLD: 0.7 K/UL (ref 0–1)
MONOCYTES NFR BLD: 9 % (ref 5–13)
NEUTS SEG # BLD: 4.6 K/UL (ref 1.8–8)
NEUTS SEG NFR BLD: 65 % (ref 32–75)
NRBC # BLD: 0 K/UL (ref 0–0.01)
NRBC BLD-RTO: 0 PER 100 WBC
PLATELET # BLD AUTO: 295 K/UL (ref 150–400)
PMV BLD AUTO: 10.9 FL (ref 8.9–12.9)
POTASSIUM SERPL-SCNC: 4.3 MMOL/L (ref 3.5–5.1)
PROT SERPL-MCNC: 7.5 G/DL (ref 6.4–8.2)
RBC # BLD AUTO: 4.53 M/UL (ref 3.8–5.2)
SODIUM SERPL-SCNC: 138 MMOL/L (ref 136–145)
WBC # BLD AUTO: 7.3 K/UL (ref 3.6–11)

## 2021-06-18 PROCEDURE — G8427 DOCREV CUR MEDS BY ELIG CLIN: HCPCS | Performed by: INTERNAL MEDICINE

## 2021-06-18 PROCEDURE — G8510 SCR DEP NEG, NO PLAN REQD: HCPCS | Performed by: INTERNAL MEDICINE

## 2021-06-18 PROCEDURE — 99213 OFFICE O/P EST LOW 20 MIN: CPT | Performed by: INTERNAL MEDICINE

## 2021-06-18 PROCEDURE — G8536 NO DOC ELDER MAL SCRN: HCPCS | Performed by: INTERNAL MEDICINE

## 2021-06-18 PROCEDURE — 1090F PRES/ABSN URINE INCON ASSESS: CPT | Performed by: INTERNAL MEDICINE

## 2021-06-18 PROCEDURE — G0463 HOSPITAL OUTPT CLINIC VISIT: HCPCS | Performed by: INTERNAL MEDICINE

## 2021-06-18 PROCEDURE — 1101F PT FALLS ASSESS-DOCD LE1/YR: CPT | Performed by: INTERNAL MEDICINE

## 2021-06-18 PROCEDURE — G8399 PT W/DXA RESULTS DOCUMENT: HCPCS | Performed by: INTERNAL MEDICINE

## 2021-06-18 PROCEDURE — G8419 CALC BMI OUT NRM PARAM NOF/U: HCPCS | Performed by: INTERNAL MEDICINE

## 2021-06-18 PROCEDURE — G8754 DIAS BP LESS 90: HCPCS | Performed by: INTERNAL MEDICINE

## 2021-06-18 PROCEDURE — G8752 SYS BP LESS 140: HCPCS | Performed by: INTERNAL MEDICINE

## 2021-06-18 RX ORDER — GENTAMICIN SULFATE 0.3 %
OINTMENT (GRAM) OPHTHALMIC (EYE)
COMMUNITY
Start: 2021-02-08 | End: 2021-06-18 | Stop reason: ALTCHOICE

## 2021-06-18 RX ORDER — MUPIROCIN 20 MG/G
OINTMENT TOPICAL
COMMUNITY
Start: 2021-02-17 | End: 2021-07-08 | Stop reason: ALTCHOICE

## 2021-06-18 RX ORDER — LACTULOSE 10 G/15ML
10 SOLUTION ORAL; RECTAL
Qty: 480 ML | Refills: 3 | Status: SHIPPED | OUTPATIENT
Start: 2021-06-18
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
## 2021-07-08 ENCOUNTER — OFFICE VISIT (OUTPATIENT)
Dept: INTERNAL MEDICINE CLINIC | Age: 76
End: 2021-07-08
Payer: MEDICARE

## 2021-07-08 VITALS
SYSTOLIC BLOOD PRESSURE: 128 MMHG | WEIGHT: 165.2 LBS | BODY MASS INDEX: 26.55 KG/M2 | DIASTOLIC BLOOD PRESSURE: 78 MMHG | HEIGHT: 66 IN | HEART RATE: 71 BPM | TEMPERATURE: 97.7 F | RESPIRATION RATE: 16 BRPM | OXYGEN SATURATION: 96 %

## 2021-07-08 DIAGNOSIS — R26.89 DECREASED MOBILITY: ICD-10-CM

## 2021-07-08 DIAGNOSIS — R10.31 CHRONIC RLQ PAIN: Primary | ICD-10-CM

## 2021-07-08 DIAGNOSIS — S80.12XA LEG HEMATOMA, LEFT, INITIAL ENCOUNTER: ICD-10-CM

## 2021-07-08 DIAGNOSIS — G89.29 CHRONIC RLQ PAIN: Primary | ICD-10-CM

## 2021-07-08 DIAGNOSIS — K59.09 OTHER CONSTIPATION: ICD-10-CM

## 2021-07-08 PROCEDURE — G8536 NO DOC ELDER MAL SCRN: HCPCS | Performed by: INTERNAL MEDICINE

## 2021-07-08 PROCEDURE — G8510 SCR DEP NEG, NO PLAN REQD: HCPCS | Performed by: INTERNAL MEDICINE

## 2021-07-08 PROCEDURE — 1101F PT FALLS ASSESS-DOCD LE1/YR: CPT | Performed by: INTERNAL MEDICINE

## 2021-07-08 PROCEDURE — G8399 PT W/DXA RESULTS DOCUMENT: HCPCS | Performed by: INTERNAL MEDICINE

## 2021-07-08 PROCEDURE — G8419 CALC BMI OUT NRM PARAM NOF/U: HCPCS | Performed by: INTERNAL MEDICINE

## 2021-07-08 PROCEDURE — G8427 DOCREV CUR MEDS BY ELIG CLIN: HCPCS | Performed by: INTERNAL MEDICINE

## 2021-07-08 PROCEDURE — 99214 OFFICE O/P EST MOD 30 MIN: CPT | Performed by: INTERNAL MEDICINE

## 2021-07-08 PROCEDURE — G8754 DIAS BP LESS 90: HCPCS | Performed by: INTERNAL MEDICINE

## 2021-07-08 PROCEDURE — G8752 SYS BP LESS 140: HCPCS | Performed by: INTERNAL MEDICINE

## 2021-07-08 PROCEDURE — G0463 HOSPITAL OUTPT CLINIC VISIT: HCPCS | Performed by: INTERNAL MEDICINE

## 2021-07-08 PROCEDURE — 1090F PRES/ABSN URINE INCON ASSESS: CPT | Performed by: INTERNAL MEDICINE
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
## 2021-07-19 ENCOUNTER — TELEPHONE (OUTPATIENT)
Dept: INTERNAL MEDICINE CLINIC | Age: 76
End: 2021-07-19

## 2021-07-19 NOTE — TELEPHONE ENCOUNTER
----- Message from Jonn Lorenzo sent at 7/19/2021  2:40 PM EDT -----  Regarding: Dr. Althea Mullen first and last name: pt      Reason for call: Pt requesting to speak with [facility], to provide update, saw \"knee doctor\" and he believes the issue is her hip, sending her for testing. Pt wants to know if she still needs to see physical therapist?      Callback required yes/no and why: yes       Best contact number(s): [phone]      Details to clarify the request: Pt wanted to note that she is home today.       Jonn Lorenzo

## 2021-07-20 NOTE — TELEPHONE ENCOUNTER
V/m left for patient notifying okay to hold on doing PT & proceed with testing ordered by ortho. Office number left if patient has additional questions or concerns.

## 2021-07-31 NOTE — TELEPHONE ENCOUNTER
Patient states she went to the dentist yesterday & is being treated for a salivary gland infection & was given a 10-day course of Keflex. Patient also takes Doxycycline 50 mg bid for a plugged gland in her eyes. She got her covid vaccine yesterday morning as well. She questions if she is able to start the keflex antibiotic since she got her covid vaccine or does she need to wait? Also if she starts the keflex should she hold the Doxycycline? Reported to RN, Mearl Mortimer, whom is assuming care at this time.      Jonathan Lord RN  07/31/21 0705

## 2021-11-02 ENCOUNTER — OFFICE VISIT (OUTPATIENT)
Dept: INTERNAL MEDICINE CLINIC | Age: 76
End: 2021-11-02
Payer: MEDICARE

## 2021-11-02 VITALS
HEART RATE: 63 BPM | WEIGHT: 162.8 LBS | HEIGHT: 66 IN | SYSTOLIC BLOOD PRESSURE: 137 MMHG | TEMPERATURE: 97.8 F | RESPIRATION RATE: 16 BRPM | DIASTOLIC BLOOD PRESSURE: 82 MMHG | BODY MASS INDEX: 26.16 KG/M2 | OXYGEN SATURATION: 96 %

## 2021-11-02 DIAGNOSIS — Z00.00 MEDICARE ANNUAL WELLNESS VISIT, SUBSEQUENT: Primary | ICD-10-CM

## 2021-11-02 DIAGNOSIS — E78.5 DYSLIPIDEMIA, GOAL LDL BELOW 100: ICD-10-CM

## 2021-11-02 DIAGNOSIS — I48.0 PAROXYSMAL ATRIAL FIBRILLATION (HCC): ICD-10-CM

## 2021-11-02 DIAGNOSIS — I10 HTN, GOAL BELOW 130/80: ICD-10-CM

## 2021-11-02 DIAGNOSIS — R73.01 IFG (IMPAIRED FASTING GLUCOSE): ICD-10-CM

## 2021-11-02 LAB
ALBUMIN SERPL-MCNC: 3.7 G/DL (ref 3.5–5)
ALBUMIN/GLOB SERPL: 1 {RATIO} (ref 1.1–2.2)
ALP SERPL-CCNC: 65 U/L (ref 45–117)
ALT SERPL-CCNC: 28 U/L (ref 12–78)
ANION GAP SERPL CALC-SCNC: 3 MMOL/L (ref 5–15)
AST SERPL-CCNC: 14 U/L (ref 15–37)
BILIRUB SERPL-MCNC: 0.4 MG/DL (ref 0.2–1)
BUN SERPL-MCNC: 22 MG/DL (ref 6–20)
BUN/CREAT SERPL: 31 (ref 12–20)
CALCIUM SERPL-MCNC: 9.2 MG/DL (ref 8.5–10.1)
CHLORIDE SERPL-SCNC: 101 MMOL/L (ref 97–108)
CHOLEST SERPL-MCNC: 260 MG/DL
CO2 SERPL-SCNC: 29 MMOL/L (ref 21–32)
CREAT SERPL-MCNC: 0.72 MG/DL (ref 0.55–1.02)
EST. AVERAGE GLUCOSE BLD GHB EST-MCNC: 123 MG/DL
GLOBULIN SER CALC-MCNC: 3.6 G/DL (ref 2–4)
GLUCOSE SERPL-MCNC: 83 MG/DL (ref 65–100)
HBA1C MFR BLD: 5.9 % (ref 4–5.6)
HDLC SERPL-MCNC: 58 MG/DL
HDLC SERPL: 4.5 {RATIO} (ref 0–5)
LDLC SERPL CALC-MCNC: 163 MG/DL (ref 0–100)
POTASSIUM SERPL-SCNC: 4.3 MMOL/L (ref 3.5–5.1)
PROT SERPL-MCNC: 7.3 G/DL (ref 6.4–8.2)
SODIUM SERPL-SCNC: 133 MMOL/L (ref 136–145)
TRIGL SERPL-MCNC: 195 MG/DL (ref ?–150)
TSH SERPL DL<=0.05 MIU/L-ACNC: 4.01 UIU/ML (ref 0.36–3.74)
VLDLC SERPL CALC-MCNC: 39 MG/DL

## 2021-11-02 PROCEDURE — 99214 OFFICE O/P EST MOD 30 MIN: CPT | Performed by: INTERNAL MEDICINE

## 2021-11-02 PROCEDURE — G8754 DIAS BP LESS 90: HCPCS | Performed by: INTERNAL MEDICINE

## 2021-11-02 PROCEDURE — G8419 CALC BMI OUT NRM PARAM NOF/U: HCPCS | Performed by: INTERNAL MEDICINE

## 2021-11-02 PROCEDURE — G8752 SYS BP LESS 140: HCPCS | Performed by: INTERNAL MEDICINE

## 2021-11-02 PROCEDURE — G0463 HOSPITAL OUTPT CLINIC VISIT: HCPCS | Performed by: INTERNAL MEDICINE

## 2021-11-02 PROCEDURE — G0439 PPPS, SUBSEQ VISIT: HCPCS | Performed by: INTERNAL MEDICINE

## 2021-11-02 PROCEDURE — G8399 PT W/DXA RESULTS DOCUMENT: HCPCS | Performed by: INTERNAL MEDICINE

## 2021-11-02 PROCEDURE — 1090F PRES/ABSN URINE INCON ASSESS: CPT | Performed by: INTERNAL MEDICINE

## 2021-11-02 PROCEDURE — G8427 DOCREV CUR MEDS BY ELIG CLIN: HCPCS | Performed by: INTERNAL MEDICINE

## 2021-11-02 PROCEDURE — G8432 DEP SCR NOT DOC, RNG: HCPCS | Performed by: INTERNAL MEDICINE

## 2021-11-02 PROCEDURE — G8536 NO DOC ELDER MAL SCRN: HCPCS | Performed by: INTERNAL MEDICINE

## 2021-11-02 PROCEDURE — 1101F PT FALLS ASSESS-DOCD LE1/YR: CPT | Performed by: INTERNAL MEDICINE

## 2021-11-02 RX ORDER — LOSARTAN POTASSIUM 50 MG/1
100 TABLET ORAL DAILY
Qty: 180 TABLET | Refills: 2 | Status: SHIPPED | OUTPATIENT
Start: 2021-11-02 | End: 2021-11-02 | Stop reason: ALTCHOICE

## 2021-11-02 RX ORDER — APIXABAN 5 MG/1
TABLET, FILM COATED ORAL
Qty: 180 TABLET | Refills: 2 | Status: SHIPPED | OUTPATIENT
Start: 2021-11-02 | End: 2021-11-02 | Stop reason: ALTCHOICE

## 2021-11-02 RX ORDER — LOSARTAN POTASSIUM 50 MG/1
100 TABLET, FILM COATED ORAL DAILY
Qty: 180 TABLET | Refills: 2 | Status: SHIPPED | OUTPATIENT
Start: 2021-11-02

## 2021-11-02 RX ORDER — APIXABAN 5 MG/1
5 TABLET, FILM COATED ORAL 2 TIMES DAILY
Qty: 180 TABLET | Refills: 2 | Status: SHIPPED | OUTPATIENT
Start: 2021-11-02

## 2021-11-02 NOTE — PATIENT INSTRUCTIONS
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
## 2021-11-04 DIAGNOSIS — E87.1 HYPONATREMIA: Primary | ICD-10-CM

## 2021-11-04 NOTE — PROGRESS NOTES
Patient notified of her borderline tsh & her low sodium level. Advised PCP would like to repeat these levels in 2-3 weeks to see if this is the start of trend. Lab orders placed at the  for pickup. Patient also notified of her elevated chol level & PCP's advice to work on dietary changes for this. Patient states she adheres to a low sodium diet which may be why her sodium is low. Advised we will get more info when she does her repeat.

## 2021-11-04 NOTE — PROGRESS NOTES
Can you let her know that her tsh is borderline for hypothyroid and the sodium is off a bit and this may be lab variation so I printed a [facility] order could she go ini the next 2-3 weeks to repeat these levels and see if this is the start of a  trend?  Her cholesterol is up so please work on dietary modification  Avg glucose looks good and kidney and liver function  normal

## 2021-12-01 ENCOUNTER — TELEPHONE (OUTPATIENT)
Dept: INTERNAL MEDICINE CLINIC | Age: 76
End: 2021-12-01

## 2021-12-01 LAB
ALBUMIN SERPL-MCNC: 4.3 G/DL (ref 3.7–4.7)
ALBUMIN/GLOB SERPL: 1.9 {RATIO} (ref 1.2–2.2)
ALP SERPL-CCNC: 64 IU/L (ref 44–121)
ALT SERPL-CCNC: 19 IU/L (ref 0–32)
AST SERPL-CCNC: 14 IU/L (ref 0–40)
BILIRUB SERPL-MCNC: 0.4 MG/DL (ref 0–1.2)
BUN SERPL-MCNC: 15 MG/DL (ref 8–27)
BUN/CREAT SERPL: 19 (ref 12–28)
CALCIUM SERPL-MCNC: 9.6 MG/DL (ref 8.7–10.3)
CHLORIDE SERPL-SCNC: 94 MMOL/L (ref 96–106)
CO2 SERPL-SCNC: 29 MMOL/L (ref 20–29)
CREAT SERPL-MCNC: 0.78 MG/DL (ref 0.57–1)
GLOBULIN SER CALC-MCNC: 2.3 G/DL (ref 1.5–4.5)
GLUCOSE SERPL-MCNC: 92 MG/DL (ref 65–99)
POTASSIUM SERPL-SCNC: 4.3 MMOL/L (ref 3.5–5.2)
PROT SERPL-MCNC: 6.6 G/DL (ref 6–8.5)
SODIUM SERPL-SCNC: 136 MMOL/L (ref 134–144)
T4 FREE SERPL-MCNC: 1.32 NG/DL (ref 0.82–1.77)
TSH SERPL DL<=0.005 MIU/L-ACNC: 3.5 UIU/ML (ref 0.45–4.5)

## 2021-12-01 NOTE — TELEPHONE ENCOUNTER
Returned call to patient. Notified her repeat labs were all normal so will continue to monitor her levels over time. Patient states she received her last labs in the mail & is concerned about her elevated chol levels. She admits to not walking as much. She has lost weight. Discussed lifestyle measures she can do to help get her chol trending back into the right direction. Encouraged her to fast for her next visit so he levels can be rechecked. She asked that her last labs be faxed to [facility] to review & her a copy of the most recent be mailed to her. Patient had no further questions or concerns.

## 2021-12-01 NOTE — TELEPHONE ENCOUNTER
Pt calling states she doesn't understand nurse's VM that she left today. Pt wants to speak with nurse regarding that VM.  Please call back and advise

## 2022-02-22 ENCOUNTER — TELEPHONE (OUTPATIENT)
Dept: INTERNAL MEDICINE CLINIC | Age: 77
End: 2022-02-22

## 2022-02-22 DIAGNOSIS — E78.5 DYSLIPIDEMIA, GOAL LDL BELOW 100: ICD-10-CM

## 2022-02-22 DIAGNOSIS — R73.01 IFG (IMPAIRED FASTING GLUCOSE): Primary | ICD-10-CM

## 2022-02-22 NOTE — TELEPHONE ENCOUNTER
Patient called this morning as she has a appointment with Mara Banks on 3/1 at [facility] and needs to fast for her blood work, states that she needs to get her blood work before appointment, requesting 2/24 to  the lab paperwork as if she goes to long without eating she gets very dizzy. If we could have the paperwork for her a few days before so she can go to [facility] to get it done so she doesn't feel dizzy that would be best for her. Subject to faint if fasting to long. Please call the patient to confirm that the lab paperwork will be ready for her to .,stopAimeeank you.  - BRANDI

## 2022-02-24 DIAGNOSIS — E78.5 DYSLIPIDEMIA, GOAL LDL BELOW 100: ICD-10-CM

## 2022-02-24 DIAGNOSIS — R73.01 IFG (IMPAIRED FASTING GLUCOSE): ICD-10-CM

## 2022-02-25 LAB
ALBUMIN SERPL-MCNC: 3.3 G/DL (ref 3.5–5)
ALBUMIN/GLOB SERPL: 1 {RATIO} (ref 1.1–2.2)
ALP SERPL-CCNC: 60 U/L (ref 45–117)
ALT SERPL-CCNC: 24 U/L (ref 12–78)
ANION GAP SERPL CALC-SCNC: 4 MMOL/L (ref 5–15)
AST SERPL-CCNC: 13 U/L (ref 15–37)
BILIRUB SERPL-MCNC: 0.5 MG/DL (ref 0.2–1)
BUN SERPL-MCNC: 17 MG/DL (ref 6–20)
BUN/CREAT SERPL: 23 (ref 12–20)
CALCIUM SERPL-MCNC: 9.3 MG/DL (ref 8.5–10.1)
CHLORIDE SERPL-SCNC: 104 MMOL/L (ref 97–108)
CHOLEST SERPL-MCNC: 239 MG/DL
CO2 SERPL-SCNC: 28 MMOL/L (ref 21–32)
CREAT SERPL-MCNC: 0.73 MG/DL (ref 0.55–1.02)
EST. AVERAGE GLUCOSE BLD GHB EST-MCNC: 117 MG/DL
GLOBULIN SER CALC-MCNC: 3.3 G/DL (ref 2–4)
GLUCOSE SERPL-MCNC: 98 MG/DL (ref 65–100)
HBA1C MFR BLD: 5.7 % (ref 4–5.6)
HDLC SERPL-MCNC: 55 MG/DL
HDLC SERPL: 4.3 {RATIO} (ref 0–5)
LDLC SERPL CALC-MCNC: 150 MG/DL (ref 0–100)
POTASSIUM SERPL-SCNC: 4.1 MMOL/L (ref 3.5–5.1)
PROT SERPL-MCNC: 6.6 G/DL (ref 6.4–8.2)
SODIUM SERPL-SCNC: 136 MMOL/L (ref 136–145)
TRIGL SERPL-MCNC: 170 MG/DL (ref ?–150)
VLDLC SERPL CALC-MCNC: 34 MG/DL

## 2022-03-01 ENCOUNTER — OFFICE VISIT (OUTPATIENT)
Dept: INTERNAL MEDICINE CLINIC | Age: 77
End: 2022-03-01
Payer: MEDICARE

## 2022-03-01 ENCOUNTER — HOSPITAL ENCOUNTER (OUTPATIENT)
Dept: GENERAL RADIOLOGY | Age: 77
Discharge: HOME | End: 2022-03-01
Payer: MEDICARE

## 2022-03-01 VITALS
BODY MASS INDEX: 26.55 KG/M2 | WEIGHT: 165.2 LBS | RESPIRATION RATE: 16 BRPM | DIASTOLIC BLOOD PRESSURE: 72 MMHG | OXYGEN SATURATION: 98 % | HEIGHT: 66 IN | HEART RATE: 67 BPM | SYSTOLIC BLOOD PRESSURE: 132 MMHG

## 2022-03-01 DIAGNOSIS — I10 PRIMARY HYPERTENSION: ICD-10-CM

## 2022-03-01 DIAGNOSIS — J47.9 BRONCHIECTASIS WITHOUT COMPLICATION (HCC): ICD-10-CM

## 2022-03-01 DIAGNOSIS — J45.40 MODERATE PERSISTENT ASTHMA WITHOUT COMPLICATION: ICD-10-CM

## 2022-03-01 DIAGNOSIS — R05.9 COUGH: Primary | ICD-10-CM

## 2022-03-01 DIAGNOSIS — I48.0 PAROXYSMAL ATRIAL FIBRILLATION (HCC): ICD-10-CM

## 2022-03-01 DIAGNOSIS — R05.9 COUGH: ICD-10-CM

## 2022-03-01 PROCEDURE — G8432 DEP SCR NOT DOC, RNG: HCPCS | Performed by: INTERNAL MEDICINE

## 2022-03-01 PROCEDURE — G8427 DOCREV CUR MEDS BY ELIG CLIN: HCPCS | Performed by: INTERNAL MEDICINE

## 2022-03-01 PROCEDURE — G8752 SYS BP LESS 140: HCPCS | Performed by: INTERNAL MEDICINE

## 2022-03-01 PROCEDURE — G0463 HOSPITAL OUTPT CLINIC VISIT: HCPCS | Performed by: INTERNAL MEDICINE

## 2022-03-01 PROCEDURE — G8399 PT W/DXA RESULTS DOCUMENT: HCPCS | Performed by: INTERNAL MEDICINE

## 2022-03-01 PROCEDURE — G8754 DIAS BP LESS 90: HCPCS | Performed by: INTERNAL MEDICINE

## 2022-03-01 PROCEDURE — G8536 NO DOC ELDER MAL SCRN: HCPCS | Performed by: INTERNAL MEDICINE

## 2022-03-01 PROCEDURE — 99214 OFFICE O/P EST MOD 30 MIN: CPT | Performed by: INTERNAL MEDICINE

## 2022-03-01 PROCEDURE — 71046 X-RAY EXAM CHEST 2 VIEWS: CPT

## 2022-03-01 PROCEDURE — G8419 CALC BMI OUT NRM PARAM NOF/U: HCPCS | Performed by: INTERNAL MEDICINE

## 2022-03-01 PROCEDURE — 1101F PT FALLS ASSESS-DOCD LE1/YR: CPT | Performed by: INTERNAL MEDICINE

## 2022-03-01 PROCEDURE — 1090F PRES/ABSN URINE INCON ASSESS: CPT | Performed by: INTERNAL MEDICINE

## 2022-03-01 RX ORDER — ALBUTEROL SULFATE 90 UG/1
1 AEROSOL, METERED RESPIRATORY (INHALATION)
Qty: 1 EACH | Refills: 3 | Status: SHIPPED | OUTPATIENT
Start: 2022-03-01

## 2022-03-01 RX ORDER — HYDRALAZINE HYDROCHLORIDE 25 MG/1
25 TABLET, FILM COATED ORAL 2 TIMES DAILY
Qty: 180 TABLET | Refills: 1 | Status: SHIPPED | OUTPATIENT
Start: 2022-03-01 | End: 2022-05-10

## 2022-03-01 RX ORDER — FLUTICASONE FUROATE AND VILANTEROL TRIFENATATE 100; 25 UG/1; UG/1
1 POWDER RESPIRATORY (INHALATION) DAILY
Qty: 1 EACH | Refills: 3 | Status: SHIPPED | OUTPATIENT
Start: 2022-03-01

## 2022-03-01 NOTE — PROGRESS NOTES
HISTORY OF PRESENT ILLNESS  Kelly Brooks is a 64 y.o. female. HPI  Seen for med check. She has been quite stressed as her son was in the hospital with apparently an adrenal bleed. During that time period her pressure was frequently spiking to [id number] diastolic. She has been using Hydralazine on a prn basis, Toprol 50 daily and Cozaar 50 daily. I have suggested using the Hydralazine 25 b.i.d. regularly and follow up with Dr. Gui Amaya in two weeks. Cough. Does have bronchiectasis. No purulent phlegm, no fevers, but more persistent cough. Encouraged to resume Breo for prevention. Encouraged chest x-ray today. Stress. Notes that she has not tolerated anxiolytics well in the past.  Does endorse a lot of stress associated with her son's health. He is improving. Review of Systems   Constitutional: Negative. Negative for chills, diaphoresis, fever and malaise/fatigue. HENT: Negative for congestion, ear discharge, ear pain, nosebleeds and sore throat. Eyes: Negative for pain and discharge. Respiratory: Positive for cough. Negative for hemoptysis, sputum production, shortness of breath and wheezing. Cardiovascular: Negative for chest pain. Neurological: Negative for headaches. Psychiatric/Behavioral: The patient is nervous/anxious. Physical Exam  Vitals and nursing note reviewed. Constitutional:       Appearance: She is well-developed. HENT:      Head: Normocephalic and atraumatic. Neck:      Thyroid: No thyromegaly. Vascular: No carotid bruit. Cardiovascular:      Rate and Rhythm: Normal rate and regular rhythm. Heart sounds: S1 normal and S2 normal. Murmur heard. Pulmonary:      Effort: Pulmonary effort is normal. No respiratory distress. Breath sounds: Rhonchi (dry crackles bases) present. No wheezing or rales. Musculoskeletal:      Cervical back: Normal range of motion and neck supple. Right lower leg: No edema.       Left lower leg: No edema. Neurological:      Mental Status: She is alert and oriented to person, place, and time. Psychiatric:         Behavior: Behavior normal.         ASSESSMENT and PLAN  Diagnoses and all orders for this visit:    1. Cough  -     XR CHEST PA LAT; Future    2. Paroxysmal atrial fibrillation (HCC)-cont eliquis and toprol    3. Bronchiectasis without complication ([address]    4. Moderate persistent asthma without complication  -     albuterol (PROVENTIL HFA, VENTOLIN HFA, PROAIR HFA) 90 mcg/actuation inhaler; Take 1 Puff by inhalation every six (6) hours as needed for Wheezing.  -     fluticasone furoate-vilanteroL (Breo Ellipta) 100-25 mcg/dose inhaler; Take 1 Puff by inhalation daily. 5. Primary hypertension  -     hydrALAZINE (APRESOLINE) 25 mg tablet; Take 1 Tablet by mouth two (2) times a day.       the following changes in treatment are made: add bid hydralazine   See cardiology dr Christel Diamond in 2-3 weeks  appt here in 2 mo  Resume Chanda Valdez

## 2022-03-18 PROBLEM — K56.609 SBO (SMALL BOWEL OBSTRUCTION) (HCC): Status: ACTIVE | Noted: 2020-01-24

## 2022-03-18 PROBLEM — I65.23 BILATERAL CAROTID ARTERY STENOSIS: Status: ACTIVE | Noted: 2019-12-19

## 2022-03-18 PROBLEM — T46.6X5A ADVERSE REACTION TO STATIN MEDICATION: Status: ACTIVE | Noted: 2020-01-24

## 2022-03-19 PROBLEM — E55.9 VITAMIN D DEFICIENCY: Status: ACTIVE | Noted: 2020-06-02

## 2022-03-19 PROBLEM — Z87.828 H/O BURNS: Status: ACTIVE | Noted: 2020-01-24

## 2022-03-19 PROBLEM — I48.0 PAROXYSMAL ATRIAL FIBRILLATION (HCC): Status: ACTIVE | Noted: 2019-12-19

## 2022-03-19 PROBLEM — D64.9 ANEMIA: Status: ACTIVE | Noted: 2020-06-02

## 2022-03-19 PROBLEM — R73.03 PREDIABETES: Status: ACTIVE | Noted: 2020-06-02

## 2022-03-19 PROBLEM — E78.2 MIXED HYPERLIPIDEMIA: Status: ACTIVE | Noted: 2020-06-02

## 2022-03-19 PROBLEM — E04.1 THYROID NODULE: Status: ACTIVE | Noted: 2020-06-02

## 2022-03-19 PROBLEM — J47.9 BRONCHIECTASIS WITHOUT COMPLICATION (HCC): Status: ACTIVE | Noted: 2020-01-24

## 2022-03-20 PROBLEM — R79.89 LFT ELEVATION: Status: ACTIVE | Noted: 2020-01-24

## 2022-04-02 ASSESSMENT — TONOMETRY
OS_IOP_MMHG: 10
OD_IOP_MMHG: 12
OD_IOP_MMHG: 13
OD_IOP_MMHG: 12
OD_IOP_MMHG: 15
OS_IOP_MMHG: 15
OS_IOP_MMHG: 14
OS_IOP_MMHG: 13
OS_IOP_MMHG: 11
OD_IOP_MMHG: 15
OS_IOP_MMHG: 10
OS_IOP_MMHG: 11
OD_IOP_MMHG: 15
OD_IOP_MMHG: 11
OS_IOP_MMHG: 15
OS_IOP_MMHG: 15
OD_IOP_MMHG: 12
OD_IOP_MMHG: 15
OS_IOP_MMHG: 12
OS_IOP_MMHG: 12
OS_IOP_MMHG: 14
OS_IOP_MMHG: 15
OD_IOP_MMHG: 14
OS_IOP_MMHG: 15
OS_IOP_MMHG: 15
OD_IOP_MMHG: 15
OD_IOP_MMHG: 12
OD_IOP_MMHG: 15
OD_IOP_MMHG: 17
OD_IOP_MMHG: 13

## 2022-04-02 ASSESSMENT — VISUAL ACUITY
OS_SC: 20/40
OD_SC: 20/70
OS_SC: 20/40-2
OD_SC: 20/50
OD_SC: 20/50
OD_SC: 20/40
OD_SC: 20/40+1
OS_SC: 20/50-1
OD_SC: 20/70+1
OS_SC: 20/50
OS_PH: CC 20/50 -2
OD_SC: 20/60
OS_CC: J5
OD_SC: 20/40
OD_CC: J5
OD_SC: 20/60
OS_SC: 20/50
OD_SC: 20/50
OS_SC: 20/100
OD_SC: 20/40+1
OS_SC: 20/50
OS_CC: J5
OD_SC: 20/30-2
OS_SC: 20/50-1
OS_SC: 20/60-1
OS_SC: 20/60-2
OS_SC: 20/40
OD_SC: 20/50+1
OD_SC: 20/30
OD_SC: 20/40
OS_SC: 20/70
OS_SC: 20/70-1
OS_SC: 20/50
OD_CC: J5
OD_SC: 20/40
OS_SC: 20/60
OS_SC: 20/50

## 2022-05-10 ENCOUNTER — OFFICE VISIT (OUTPATIENT)
Dept: INTERNAL MEDICINE CLINIC | Age: 77
End: 2022-05-10
Payer: MEDICARE

## 2022-05-10 VITALS
RESPIRATION RATE: 16 BRPM | HEIGHT: 66 IN | SYSTOLIC BLOOD PRESSURE: 148 MMHG | HEART RATE: 75 BPM | DIASTOLIC BLOOD PRESSURE: 92 MMHG | OXYGEN SATURATION: 97 % | BODY MASS INDEX: 26.23 KG/M2 | TEMPERATURE: 97.5 F | WEIGHT: 163.2 LBS

## 2022-05-10 DIAGNOSIS — I10 HTN, GOAL BELOW 130/80: Primary | ICD-10-CM

## 2022-05-10 DIAGNOSIS — I48.0 PAROXYSMAL ATRIAL FIBRILLATION (HCC): ICD-10-CM

## 2022-05-10 DIAGNOSIS — L03.012 PARONYCHIA OF FINGER, LEFT: ICD-10-CM

## 2022-05-10 PROCEDURE — G8427 DOCREV CUR MEDS BY ELIG CLIN: HCPCS | Performed by: INTERNAL MEDICINE

## 2022-05-10 PROCEDURE — 99213 OFFICE O/P EST LOW 20 MIN: CPT | Performed by: INTERNAL MEDICINE

## 2022-05-10 PROCEDURE — G8432 DEP SCR NOT DOC, RNG: HCPCS | Performed by: INTERNAL MEDICINE

## 2022-05-10 PROCEDURE — G8536 NO DOC ELDER MAL SCRN: HCPCS | Performed by: INTERNAL MEDICINE

## 2022-05-10 PROCEDURE — G0463 HOSPITAL OUTPT CLINIC VISIT: HCPCS | Performed by: INTERNAL MEDICINE

## 2022-05-10 PROCEDURE — G8419 CALC BMI OUT NRM PARAM NOF/U: HCPCS | Performed by: INTERNAL MEDICINE

## 2022-05-10 PROCEDURE — G8399 PT W/DXA RESULTS DOCUMENT: HCPCS | Performed by: INTERNAL MEDICINE

## 2022-05-10 PROCEDURE — 1090F PRES/ABSN URINE INCON ASSESS: CPT | Performed by: INTERNAL MEDICINE

## 2022-05-10 PROCEDURE — G8755 DIAS BP > OR = 90: HCPCS | Performed by: INTERNAL MEDICINE

## 2022-05-10 PROCEDURE — G8753 SYS BP > OR = 140: HCPCS | Performed by: INTERNAL MEDICINE

## 2022-05-10 PROCEDURE — 1101F PT FALLS ASSESS-DOCD LE1/YR: CPT | Performed by: INTERNAL MEDICINE

## 2022-05-10 RX ORDER — HYDRALAZINE HYDROCHLORIDE 10 MG/1
10 TABLET, FILM COATED ORAL 2 TIMES DAILY
COMMUNITY

## 2022-05-10 NOTE — PROGRESS NOTES
HISTORY OF PRESENT ILLNESS  Bossman Starr is a 64 y.o. female. HPI  Since our last visit she has been working with Dr. Kyle Jacob. She had an episode of almost passing out and he dropped her Hydralazine from 25 to 10 mg b.i.d. She has not had syncopal spells since then. She did have a nuclear stress test in his office last week, awaiting results. She has been diagnosed with having intermittent atrial fibrillation and does take Eliquis. Has had no evidence of bleeding. Complains of asymmetry of collarbones, intermittent swelling of left finger near the nailbed. Currently it is doing well. Concern about colonoscopy. Was advised to have a three year follow up and wants my opinion on this. Review of Systems   Constitutional: Positive for malaise/fatigue. Negative for chills, fever and weight loss. Respiratory: Negative for cough, shortness of breath and wheezing. Cardiovascular: Negative for chest pain, palpitations, orthopnea, leg swelling and PND. Gastrointestinal: Negative for abdominal pain, heartburn and nausea. Musculoskeletal: Negative for falls and myalgias. Neurological: Negative for dizziness, loss of consciousness and headaches. Physical Exam  Vitals and nursing note reviewed. Constitutional:       Appearance: She is well-developed. HENT:      Head: Normocephalic and atraumatic. Neck:      Thyroid: No thyromegaly. Vascular: No carotid bruit. Cardiovascular:      Rate and Rhythm: Normal rate and regular rhythm. Heart sounds: S1 normal and S2 normal. Murmur heard. Pulmonary:      Effort: Pulmonary effort is normal. No respiratory distress. Breath sounds: Normal breath sounds. No wheezing or rales. Musculoskeletal:      Cervical back: Normal range of motion and neck supple. Comments: No redness of fingers or swelling currently  Near nail bed   Neurological:      Mental Status: She is alert and oriented to person, place, and time.    Psychiatric: Behavior: Behavior normal.         ASSESSMENT and PLAN  Diagnoses and all orders for this visit:    1. HTN, goal below 130/80    2.  Paroxysmal atrial fibrillation (HCC)    3. Paronychia of finger, left    Will follow up with dr Bebeto Moise on bp and results of nuclear stress test  Cont the eliquis and lower dose bp meds  Bacitracin prn for paronychia

## 2022-05-20 ENCOUNTER — TELEPHONE (OUTPATIENT)
Dept: INTERNAL MEDICINE CLINIC | Age: 77
End: 2022-05-20

## 2022-05-20 NOTE — TELEPHONE ENCOUNTER
Pt is calling states last night she scraped her toe on a nail. Pt wants to know if she has to get a tetanus shot again.  Please call back and advise

## 2022-05-20 NOTE — TELEPHONE ENCOUNTER
Patient states she her toe got caught under the couch & scraped it on a nail. She is not having any bleeding issues. After reviewing the patient's chart we did not have a tdap listed. Checked the VIIS & she does not come up. Patient stated she got a tdap in Sept 2013. Advised for her to update her tdap today. She will get it done today at her local pharmacy. She was thankful for the call back & the info.

## 2022-05-27 ENCOUNTER — TELEPHONE (OUTPATIENT)
Dept: INTERNAL MEDICINE CLINIC | Age: 77
End: 2022-05-27

## 2022-05-27 NOTE — TELEPHONE ENCOUNTER
Patient would like a call back to discuss the Covid booster with the nurse. Please call back and advise.

## 2022-05-27 NOTE — TELEPHONE ENCOUNTER
Patient is unsure if she has received the 2nd covid booster. Patient does not come up in the [id number] website but feels she got a 2nd booster in Feb. Asked her to confirm with her pharmacy on this. She will go forward with her booster if the pharmacy does not have a record of 2nd one. She was thankful for the return call.

## 2022-07-18 RX ORDER — DOXYCYCLINE HYCLATE 50 MG/1
CAPSULE ORAL
Qty: 180 CAPSULE | OUTPATIENT
Start: 2022-07-18

## 2022-08-16 ENCOUNTER — OFFICE VISIT (OUTPATIENT)
Dept: INTERNAL MEDICINE CLINIC | Age: 77
End: 2022-08-16
Payer: MEDICARE

## 2022-08-16 VITALS
OXYGEN SATURATION: 97 % | HEART RATE: 64 BPM | TEMPERATURE: 98.1 F | RESPIRATION RATE: 16 BRPM | HEIGHT: 66 IN | SYSTOLIC BLOOD PRESSURE: 138 MMHG | DIASTOLIC BLOOD PRESSURE: 81 MMHG | WEIGHT: 166 LBS | BODY MASS INDEX: 26.68 KG/M2

## 2022-08-16 DIAGNOSIS — M19.91 PRIMARY LOCALIZED OSTEOARTHROSIS: ICD-10-CM

## 2022-08-16 DIAGNOSIS — I48.0 PAROXYSMAL ATRIAL FIBRILLATION (HCC): ICD-10-CM

## 2022-08-16 DIAGNOSIS — R42 VERTIGO: ICD-10-CM

## 2022-08-16 DIAGNOSIS — I10 HTN, GOAL BELOW 130/80: Primary | ICD-10-CM

## 2022-08-16 DIAGNOSIS — R39.198 DIFFICULTY VOIDING: ICD-10-CM

## 2022-08-16 LAB
ALBUMIN SERPL-MCNC: 3.4 G/DL (ref 3.5–5)
ALBUMIN/GLOB SERPL: 1.1 {RATIO} (ref 1.1–2.2)
ALP SERPL-CCNC: 64 U/L (ref 45–117)
ALT SERPL-CCNC: 24 U/L (ref 12–78)
ANION GAP SERPL CALC-SCNC: 6 MMOL/L (ref 5–15)
APPEARANCE UR: CLEAR
AST SERPL-CCNC: 10 U/L (ref 15–37)
BACTERIA URNS QL MICRO: ABNORMAL /HPF
BILIRUB SERPL-MCNC: 0.3 MG/DL (ref 0.2–1)
BILIRUB UR QL: NEGATIVE
BUN SERPL-MCNC: 23 MG/DL (ref 6–20)
BUN/CREAT SERPL: 33 (ref 12–20)
CALCIUM SERPL-MCNC: 9.5 MG/DL (ref 8.5–10.1)
CHLORIDE SERPL-SCNC: 100 MMOL/L (ref 97–108)
CO2 SERPL-SCNC: 29 MMOL/L (ref 21–32)
COLOR UR: ABNORMAL
CREAT SERPL-MCNC: 0.69 MG/DL (ref 0.55–1.02)
EPITH CASTS URNS QL MICRO: ABNORMAL /LPF
GLOBULIN SER CALC-MCNC: 3.2 G/DL (ref 2–4)
GLUCOSE SERPL-MCNC: 104 MG/DL (ref 65–100)
GLUCOSE UR STRIP.AUTO-MCNC: NEGATIVE MG/DL
HGB UR QL STRIP: NEGATIVE
KETONES UR QL STRIP.AUTO: NEGATIVE MG/DL
LEUKOCYTE ESTERASE UR QL STRIP.AUTO: ABNORMAL
NITRITE UR QL STRIP.AUTO: NEGATIVE
PH UR STRIP: 6 [PH] (ref 5–8)
POTASSIUM SERPL-SCNC: 4.3 MMOL/L (ref 3.5–5.1)
PROT SERPL-MCNC: 6.6 G/DL (ref 6.4–8.2)
PROT UR STRIP-MCNC: NEGATIVE MG/DL
RBC #/AREA URNS HPF: ABNORMAL /HPF (ref 0–5)
SODIUM SERPL-SCNC: 135 MMOL/L (ref 136–145)
SP GR UR REFRACTOMETRY: 1.02 (ref 1–1.03)
UROBILINOGEN UR QL STRIP.AUTO: 0.2 EU/DL (ref 0.2–1)
WBC URNS QL MICRO: ABNORMAL /HPF (ref 0–4)
YEAST BUDDING URNS QL: PRESENT
YEAST URNS QL MICRO: PRESENT

## 2022-08-16 PROCEDURE — G0463 HOSPITAL OUTPT CLINIC VISIT: HCPCS | Performed by: INTERNAL MEDICINE

## 2022-08-16 PROCEDURE — G8536 NO DOC ELDER MAL SCRN: HCPCS | Performed by: INTERNAL MEDICINE

## 2022-08-16 PROCEDURE — 1090F PRES/ABSN URINE INCON ASSESS: CPT | Performed by: INTERNAL MEDICINE

## 2022-08-16 PROCEDURE — G8399 PT W/DXA RESULTS DOCUMENT: HCPCS | Performed by: INTERNAL MEDICINE

## 2022-08-16 PROCEDURE — G8432 DEP SCR NOT DOC, RNG: HCPCS | Performed by: INTERNAL MEDICINE

## 2022-08-16 PROCEDURE — G8427 DOCREV CUR MEDS BY ELIG CLIN: HCPCS | Performed by: INTERNAL MEDICINE

## 2022-08-16 PROCEDURE — G8417 CALC BMI ABV UP PARAM F/U: HCPCS | Performed by: INTERNAL MEDICINE

## 2022-08-16 PROCEDURE — 1101F PT FALLS ASSESS-DOCD LE1/YR: CPT | Performed by: INTERNAL MEDICINE

## 2022-08-16 PROCEDURE — 99214 OFFICE O/P EST MOD 30 MIN: CPT | Performed by: INTERNAL MEDICINE

## 2022-08-16 PROCEDURE — G8754 DIAS BP LESS 90: HCPCS | Performed by: INTERNAL MEDICINE

## 2022-08-16 PROCEDURE — G8752 SYS BP LESS 140: HCPCS | Performed by: INTERNAL MEDICINE

## 2022-08-16 RX ORDER — MECLIZINE HYDROCHLORIDE 25 MG/1
25 TABLET ORAL
Qty: 25 TABLET | Refills: 0 | Status: SHIPPED | OUTPATIENT
Start: 2022-08-16 | End: 2022-08-20

## 2022-08-16 NOTE — PROGRESS NOTES
HISTORY OF PRESENT ILLNESS  Lydia Cassidy is a 76 y.o. female. HPI  Follow up. Since our last visit she has seen Dr. Fay Kilgore from endocrine. Thyroid levels were normal, A1c was 6%. She is thinking of getting a second opinion from a different doctor. Paroxysmal a-fib, working with Dr. Aimee Tanner, who has recommended ablation procedure. She is reluctant, worried about risk of infection. Had a knee steroid shot yesterday with Dr. Claudette Strange. Today, although she has voided, does not feel she is voiding as much as normal.  No dysuria, no hematuria. Does have history of issues with bladder prolapse. Will check UA today and chemistries. Review of Systems   Constitutional:  Negative for chills, fever and weight loss. Respiratory:  Negative for cough, shortness of breath and wheezing. Cardiovascular:  Positive for palpitations. Negative for chest pain, orthopnea, leg swelling and PND. Gastrointestinal:  Negative for abdominal pain, heartburn, nausea and vomiting. Genitourinary:  Negative for dysuria, flank pain, frequency and hematuria. Musculoskeletal:  Negative for myalgias. Neurological:  Positive for dizziness. Negative for headaches. Psychiatric/Behavioral:  The patient is nervous/anxious. Physical Exam  Vitals and nursing note reviewed. Constitutional:       Appearance: She is well-developed. HENT:      Head: Normocephalic and atraumatic. Neck:      Thyroid: No thyromegaly. Vascular: No carotid bruit. Cardiovascular:      Rate and Rhythm: Normal rate and regular rhythm. Heart sounds: Normal heart sounds, S1 normal and S2 normal. No murmur heard. Pulmonary:      Effort: Pulmonary effort is normal. No respiratory distress. Breath sounds: Normal breath sounds. No wheezing or rales. Abdominal:      Tenderness: There is no right CVA tenderness or left CVA tenderness. Musculoskeletal:      Cervical back: Normal range of motion and neck supple.    Neurological: Mental Status: She is alert and oriented to person, place, and time. Psychiatric:         Behavior: Behavior normal.       ASSESSMENT and PLAN  Diagnoses and all orders for this visit:    1. HTN, goal below 130/80-cont meds discussed cardiac ablation procedure as suggested by dr Sunny Perera with me in 4mo  -     METABOLIC PANEL, COMPREHENSIVE; Future    2. Vertigo  -     meclizine (ANTIVERT) 25 mg tablet; Take 1 Tablet by mouth three (3) times daily as needed for Dizziness for up to 10 days. 3. Difficulty voiding  -     URINALYSIS W/ RFLX MICROSCOPIC; Future    4. Primary localized osteoarthrosis    5.  Paroxysmal atrial fibrillation (HCC)

## 2022-08-17 RX ORDER — FLUCONAZOLE 150 MG/1
150 TABLET ORAL DAILY
Qty: 2 TABLET | Refills: 0 | Status: SHIPPED | OUTPATIENT
Start: 2022-08-17 | End: 2022-08-18 | Stop reason: SDUPTHER

## 2022-08-17 NOTE — PROGRESS NOTES
Pt notified of UA showing yeast & PCP covering for a yeast infection. Advised her renal function is good.

## 2022-08-17 NOTE — PROGRESS NOTES
Can you let hr know that her renal function is fine  Urine shows yeast and some bacteria  But few wbc  Not suggestive of bacterial infection ( low wbc) but I am covering for yeast infection with diflucan take a dose now and repeat in 1 week and push water

## 2022-08-18 ENCOUNTER — TELEPHONE (OUTPATIENT)
Dept: INTERNAL MEDICINE CLINIC | Age: 77
End: 2022-08-18

## 2022-08-18 RX ORDER — FLUCONAZOLE 150 MG/1
150 TABLET ORAL DAILY
Qty: 2 TABLET | Refills: 0 | Status: SHIPPED | OUTPATIENT
Start: 2022-08-18 | End: 2022-08-19

## 2022-08-18 NOTE — TELEPHONE ENCOUNTER
Message per provider:  Fine to send diflucan  to travel with and advise also getting some azo to take on the trip for prn use for dysuria  thanks     Pt notified of message as stated above. New Rx for Diflucan sent to pharmacy.

## 2022-08-18 NOTE — TELEPHONE ENCOUNTER
----- Message from Danielle Ptots sent at 8/18/2022  8:14 AM EDT -----  Subject: Message to Provider    QUESTIONS  Information for Provider? patient would like to speak to [facility]. She   said she got a call from her about some medication. The way she said to   take it and the way it says on the pharmacy are not the same. She said   that she would also like a script to take with her for her vacation.  She   is leaving in 5 day.   ---------------------------------------------------------------------------  --------------  Carmella Galan [facility]  [phone]; OK to leave message on voicemail  ---------------------------------------------------------------------------  --------------  SCRIPT ANSWERS  undefined

## 2022-08-18 NOTE — TELEPHONE ENCOUNTER
Clarified the directions for the diflucan. She started the diflucan yesterday but she reports some dysuria. She is hopeful the diflucan will help. She has an 11-day trip coming up & has history of hard time fighting a yeast infection. She is asking for a diflucan rx to take with her to have as she will be on a river boat & no access to a doctor.

## 2022-08-19 DIAGNOSIS — R42 VERTIGO: ICD-10-CM

## 2022-08-20 RX ORDER — MECLIZINE HYDROCHLORIDE 25 MG/1
TABLET ORAL
Qty: 25 TABLET | Refills: 0 | Status: SHIPPED | OUTPATIENT
Start: 2022-08-20

## 2022-08-22 ENCOUNTER — TELEPHONE (OUTPATIENT)
Dept: INTERNAL MEDICINE CLINIC | Age: 77
End: 2022-08-22

## 2022-08-22 NOTE — TELEPHONE ENCOUNTER
----- Message from Carlos Vasquez sent at 8/22/2022 11:18 AM EDT -----  Subject: Medication Problem    Medication: Eliquis 5 mg tablet  Dosage: 5 mg twice a day  Ordering Provider: Dr. Angelina Buckner    Question/Problem: One of the side effects from taking the Eliquis is   bleeding through the skin. She has some of the splotches but, there is one   on her leg that looks very odd. Would like to talk to [facility]. Please   call patient to advise. Leaving for out of town on 9/1.   Additional Information for Provider:     Pharmacy: [facility]    ---------------------------------------------------------------------------  --------------  [facility]  [phone]; OK to leave message on voicemail  ---------------------------------------------------------------------------  --------------    SCRIPT ANSWERS  Relationship to Patient: Self

## 2022-08-23 NOTE — TELEPHONE ENCOUNTER
Message per provider: Hemant Counts  her I suspect it is from being on the blood thinner and not worrisome-she can double check with cardiology             Patient states she has a purplish splotch on her thigh that worries her. It is not painful but the other splotches on her skin are more red. Discussed bruising is very common when on eliquis & advised per PCP does not sound worrisome. She was encouraged to contact cardiology to get an opinion as well. Patient had no further questions or concerns.

## 2022-09-12 ENCOUNTER — TELEPHONE (OUTPATIENT)
Dept: INTERNAL MEDICINE CLINIC | Age: 77
End: 2022-09-12

## 2022-09-12 NOTE — TELEPHONE ENCOUNTER
[phone]    Patient called stating she was prescribed Fluconazole for a yeast infection but now she is burning and not sure the medicine is working. Please call  her at the above number, not the number on file.

## 2022-09-12 NOTE — TELEPHONE ENCOUNTER
Pt c/o vaginal burning. The burning is present with just sitting & pt states she is very uncomfortable. Her recent UA showed yeast so MD covered for a yeast infection. Pt feels the burning is more external than internal. Advised visit for UA & pelvic exam. Pt agreed & provided a work in appt tomorrow at 8:00. Pt was thankful for the appt.

## 2022-09-13 ENCOUNTER — OFFICE VISIT (OUTPATIENT)
Dept: INTERNAL MEDICINE CLINIC | Age: 77
End: 2022-09-13
Payer: MEDICARE

## 2022-09-13 VITALS
SYSTOLIC BLOOD PRESSURE: 148 MMHG | BODY MASS INDEX: 26.36 KG/M2 | HEIGHT: 66 IN | RESPIRATION RATE: 16 BRPM | HEART RATE: 77 BPM | OXYGEN SATURATION: 99 % | TEMPERATURE: 98.1 F | WEIGHT: 164 LBS | DIASTOLIC BLOOD PRESSURE: 88 MMHG

## 2022-09-13 DIAGNOSIS — R30.9 URINARY PAIN: ICD-10-CM

## 2022-09-13 DIAGNOSIS — I10 HTN, GOAL BELOW 130/80: ICD-10-CM

## 2022-09-13 DIAGNOSIS — R53.83 OTHER FATIGUE: ICD-10-CM

## 2022-09-13 DIAGNOSIS — N94.9 VAGINAL BURNING: Primary | ICD-10-CM

## 2022-09-13 DIAGNOSIS — F41.9 ANXIETY: ICD-10-CM

## 2022-09-13 DIAGNOSIS — R07.89 CHEST TIGHTNESS: ICD-10-CM

## 2022-09-13 LAB
BILIRUB UR QL STRIP: NEGATIVE
GLUCOSE UR-MCNC: NEGATIVE MG/DL
KETONES P FAST UR STRIP-MCNC: NEGATIVE MG/DL
PH UR STRIP: 6 [PH] (ref 4.6–8)
PROT UR QL STRIP: NEGATIVE
SP GR UR STRIP: 1.02 (ref 1–1.03)
UA UROBILINOGEN AMB POC: NORMAL (ref 0.2–1)
URINALYSIS CLARITY POC: NORMAL
URINALYSIS COLOR POC: YELLOW
URINE BLOOD POC: NORMAL
URINE LEUKOCYTES POC: NORMAL
URINE NITRITES POC: POSITIVE

## 2022-09-13 PROCEDURE — G8753 SYS BP > OR = 140: HCPCS | Performed by: INTERNAL MEDICINE

## 2022-09-13 PROCEDURE — 93005 ELECTROCARDIOGRAM TRACING: CPT | Performed by: INTERNAL MEDICINE

## 2022-09-13 PROCEDURE — G8417 CALC BMI ABV UP PARAM F/U: HCPCS | Performed by: INTERNAL MEDICINE

## 2022-09-13 PROCEDURE — 81001 URINALYSIS AUTO W/SCOPE: CPT | Performed by: INTERNAL MEDICINE

## 2022-09-13 PROCEDURE — 1090F PRES/ABSN URINE INCON ASSESS: CPT | Performed by: INTERNAL MEDICINE

## 2022-09-13 PROCEDURE — G8754 DIAS BP LESS 90: HCPCS | Performed by: INTERNAL MEDICINE

## 2022-09-13 PROCEDURE — 93010 ELECTROCARDIOGRAM REPORT: CPT | Performed by: INTERNAL MEDICINE

## 2022-09-13 PROCEDURE — G8399 PT W/DXA RESULTS DOCUMENT: HCPCS | Performed by: INTERNAL MEDICINE

## 2022-09-13 PROCEDURE — 1101F PT FALLS ASSESS-DOCD LE1/YR: CPT | Performed by: INTERNAL MEDICINE

## 2022-09-13 PROCEDURE — G8427 DOCREV CUR MEDS BY ELIG CLIN: HCPCS | Performed by: INTERNAL MEDICINE

## 2022-09-13 PROCEDURE — G8510 SCR DEP NEG, NO PLAN REQD: HCPCS | Performed by: INTERNAL MEDICINE

## 2022-09-13 PROCEDURE — G0463 HOSPITAL OUTPT CLINIC VISIT: HCPCS | Performed by: INTERNAL MEDICINE

## 2022-09-13 PROCEDURE — G8536 NO DOC ELDER MAL SCRN: HCPCS | Performed by: INTERNAL MEDICINE

## 2022-09-13 PROCEDURE — 99214 OFFICE O/P EST MOD 30 MIN: CPT | Performed by: INTERNAL MEDICINE

## 2022-09-13 RX ORDER — PHENAZOPYRIDINE HYDROCHLORIDE 200 MG/1
200 TABLET, FILM COATED ORAL
Qty: 12 TABLET | Refills: 0 | Status: SHIPPED | OUTPATIENT
Start: 2022-09-13 | End: 2022-09-16

## 2022-09-13 NOTE — PROGRESS NOTES
HISTORY OF PRESENT ILLNESS  Johnny Aviles is a 76 y.o. female. ANTONY Ozuna is working with vaginal burnings. She has chronic discharge which has not changed. No fevers or chills, but endorses not feeling right and being tired. Also feels some recent chest tightness, wonders if it is her asthma. No shortness of breath. Did not take hydralazine this morning and pressure is high, has been instructed by Dr. Angela Landeros to use it on a p.r.n. basis. No asymmetry or flank pain. No abdominal pain. Review of Systems   Constitutional:  Negative for chills, fever and weight loss. Respiratory:  Negative for cough, shortness of breath and wheezing. Cardiovascular:  Positive for chest pain. Negative for palpitations, orthopnea, leg swelling and PND. Gastrointestinal:  Negative for abdominal pain, diarrhea, heartburn, nausea and vomiting. Genitourinary:  Positive for dysuria and urgency. Negative for flank pain, frequency and hematuria. Musculoskeletal:  Negative for myalgias. Neurological:  Negative for dizziness and headaches. Psychiatric/Behavioral:  The patient is nervous/anxious. Physical Exam  Vitals and nursing note reviewed. Constitutional:       Appearance: She is well-developed. HENT:      Head: Normocephalic and atraumatic. Neck:      Thyroid: No thyromegaly. Vascular: No carotid bruit. Cardiovascular:      Rate and Rhythm: Normal rate and regular rhythm. Heart sounds: Normal heart sounds, S1 normal and S2 normal. No murmur heard. Pulmonary:      Effort: Pulmonary effort is normal. No respiratory distress. Breath sounds: Normal breath sounds. No wheezing or rales. Genitourinary:     Vagina: No vaginal discharge. Comments: Atrophic vaginal tissue and no lesions seen  No discharge in vault  Musculoskeletal:      Cervical back: Normal range of motion and neck supple. Neurological:      Mental Status: She is alert and oriented to person, place, and time. Psychiatric:         Behavior: Behavior normal.       ASSESSMENT and PLAN  Diagnoses and all orders for this visit:    1. Vaginal burning  -     AMB POC URINALYSIS DIP STICK AUTO W/ MICRO  -     phenazopyridine (PYRIDIUM) 200 mg tablet; Take 1 Tablet by mouth three (3) times daily as needed for Pain for up to 3 days. 2. Other fatigue-ua abnormal but only 1+  leuk ? Uti versus vaginitis  Check ur cx and  vaginal cx  May benefit from topical estrogen    3. Chest tightness-ekg  normal  today and sxs atypical -advised see  dr Hasmukh Srinivasan in next week and cont breo for asthma    4. HTN, goal below 130/80-take the hydralazine as rx by cardiology    5.  Anxiety

## 2022-09-14 LAB
APPEARANCE UR: ABNORMAL
BACTERIA URNS QL MICRO: ABNORMAL /HPF
BILIRUB UR QL: NEGATIVE
COLOR UR: ABNORMAL
EPITH CASTS URNS QL MICRO: ABNORMAL /LPF
GLUCOSE UR STRIP.AUTO-MCNC: NEGATIVE MG/DL
HGB UR QL STRIP: ABNORMAL
HYALINE CASTS URNS QL MICRO: ABNORMAL /LPF (ref 0–5)
KETONES UR QL STRIP.AUTO: NEGATIVE MG/DL
LEUKOCYTE ESTERASE UR QL STRIP.AUTO: ABNORMAL
NITRITE UR QL STRIP.AUTO: NEGATIVE
PH UR STRIP: 5.5 [PH] (ref 5–8)
PROT UR STRIP-MCNC: NEGATIVE MG/DL
RBC #/AREA URNS HPF: ABNORMAL /HPF (ref 0–5)
SP GR UR REFRACTOMETRY: 1.02 (ref 1–1.03)
UROBILINOGEN UR QL STRIP.AUTO: 0.2 EU/DL (ref 0.2–1)
WBC URNS QL MICRO: >100 /HPF (ref 0–4)

## 2022-09-14 RX ORDER — CIPROFLOXACIN 500 MG/1
500 TABLET ORAL 2 TIMES DAILY
Qty: 10 TABLET | Refills: 0 | Status: SHIPPED | OUTPATIENT
Start: 2022-09-14

## 2022-09-14 NOTE — PROGRESS NOTES
Pt notified of UA results confirming an active uti. Advised Cipro has been sent to cover the infection & advised once the urine culture & the vaginal swab comes back we will be in touch.

## 2022-09-14 NOTE — PROGRESS NOTES
Can you let her know that her ua does suggest uti with over 100 wbc in urine so I did send in cipro bid for 5 days to cover for this went to [facility] thanks

## 2022-09-15 LAB
A VAGINAE DNA VAG QL NAA+PROBE: NORMAL SCORE
BVAB2 DNA VAG QL NAA+PROBE: NORMAL SCORE
C ALBICANS DNA VAG QL NAA+PROBE: NEGATIVE
C GLABRATA DNA VAG QL NAA+PROBE: NEGATIVE
MEGA1 DNA VAG QL NAA+PROBE: NORMAL SCORE
T VAGINALIS DNA VAG QL NAA+PROBE: NEGATIVE

## 2022-09-17 LAB
BACTERIA SPEC CULT: ABNORMAL
CC UR VC: ABNORMAL
SERVICE CMNT-IMP: ABNORMAL

## 2022-10-05 ENCOUNTER — TELEPHONE (OUTPATIENT)
Dept: INTERNAL MEDICINE CLINIC | Age: 77
End: 2022-10-05

## 2022-10-05 NOTE — TELEPHONE ENCOUNTER
Pt is calling has a couple of questions regarding her breast surgeon. Pt wanted to know if she should see a different surgeon or keep the one she has now.  Please advise
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
## 2022-11-14 ENCOUNTER — TELEPHONE (OUTPATIENT)
Dept: INTERNAL MEDICINE CLINIC | Age: 77
End: 2022-11-14

## 2022-11-14 NOTE — TELEPHONE ENCOUNTER
Returned call to patient. She has just returned home from a trip & her urinary burning has returned. Advised needs follow up appt for urine collection & eval. Pt offered appt today but she declined. She has scheduled for Thur at 7:30. Did let her know we could see her sooner if her schedule changes. Pt voiced understanding.

## 2022-11-14 NOTE — TELEPHONE ENCOUNTER
Patient states she would like to know if she can have a refill of Cipro. Patient states shortly after she completed her medication she noticed the burning sensation was coming back. Patient would like a call back to discuss this with the nurse.

## 2022-11-17 ENCOUNTER — OFFICE VISIT (OUTPATIENT)
Dept: INTERNAL MEDICINE CLINIC | Age: 77
End: 2022-11-17
Payer: MEDICARE

## 2022-11-17 VITALS
RESPIRATION RATE: 16 BRPM | WEIGHT: 165 LBS | BODY MASS INDEX: 26.52 KG/M2 | TEMPERATURE: 97.8 F | HEIGHT: 66 IN | DIASTOLIC BLOOD PRESSURE: 84 MMHG | SYSTOLIC BLOOD PRESSURE: 147 MMHG | OXYGEN SATURATION: 99 % | HEART RATE: 71 BPM

## 2022-11-17 DIAGNOSIS — R30.9 URINATION PAIN: Primary | ICD-10-CM

## 2022-11-17 DIAGNOSIS — R30.0 DYSURIA: ICD-10-CM

## 2022-11-17 DIAGNOSIS — Z00.00 MEDICARE ANNUAL WELLNESS VISIT, SUBSEQUENT: ICD-10-CM

## 2022-11-17 DIAGNOSIS — I48.0 PAROXYSMAL ATRIAL FIBRILLATION (HCC): ICD-10-CM

## 2022-11-17 LAB
BILIRUB UR QL STRIP: NEGATIVE
GLUCOSE UR-MCNC: NEGATIVE MG/DL
KETONES P FAST UR STRIP-MCNC: NEGATIVE MG/DL
PH UR STRIP: 6 [PH] (ref 4.6–8)
PROT UR QL STRIP: NEGATIVE
SP GR UR STRIP: 1.01 (ref 1–1.03)
UA UROBILINOGEN AMB POC: NORMAL (ref 0.2–1)
URINALYSIS CLARITY POC: NORMAL
URINALYSIS COLOR POC: YELLOW
URINE BLOOD POC: NORMAL
URINE LEUKOCYTES POC: NORMAL
URINE NITRITES POC: NEGATIVE

## 2022-11-17 PROCEDURE — G8753 SYS BP > OR = 140: HCPCS | Performed by: INTERNAL MEDICINE

## 2022-11-17 PROCEDURE — G0439 PPPS, SUBSEQ VISIT: HCPCS | Performed by: INTERNAL MEDICINE

## 2022-11-17 PROCEDURE — G0463 HOSPITAL OUTPT CLINIC VISIT: HCPCS | Performed by: INTERNAL MEDICINE

## 2022-11-17 PROCEDURE — 1090F PRES/ABSN URINE INCON ASSESS: CPT | Performed by: INTERNAL MEDICINE

## 2022-11-17 PROCEDURE — G8432 DEP SCR NOT DOC, RNG: HCPCS | Performed by: INTERNAL MEDICINE

## 2022-11-17 PROCEDURE — 99213 OFFICE O/P EST LOW 20 MIN: CPT | Performed by: INTERNAL MEDICINE

## 2022-11-17 PROCEDURE — G8427 DOCREV CUR MEDS BY ELIG CLIN: HCPCS | Performed by: INTERNAL MEDICINE

## 2022-11-17 PROCEDURE — G8754 DIAS BP LESS 90: HCPCS | Performed by: INTERNAL MEDICINE

## 2022-11-17 PROCEDURE — 81001 URINALYSIS AUTO W/SCOPE: CPT | Performed by: INTERNAL MEDICINE

## 2022-11-17 PROCEDURE — 1101F PT FALLS ASSESS-DOCD LE1/YR: CPT | Performed by: INTERNAL MEDICINE

## 2022-11-17 PROCEDURE — G8399 PT W/DXA RESULTS DOCUMENT: HCPCS | Performed by: INTERNAL MEDICINE

## 2022-11-17 PROCEDURE — G8417 CALC BMI ABV UP PARAM F/U: HCPCS | Performed by: INTERNAL MEDICINE

## 2022-11-17 PROCEDURE — G8536 NO DOC ELDER MAL SCRN: HCPCS | Performed by: INTERNAL MEDICINE

## 2022-11-17 RX ORDER — ESTRADIOL 0.1 MG/G
CREAM VAGINAL
Qty: 42 G | Refills: 3 | Status: SHIPPED | OUTPATIENT
Start: 2022-11-17

## 2022-11-17 RX ORDER — CIPROFLOXACIN 250 MG/1
250 TABLET, FILM COATED ORAL 2 TIMES DAILY
Qty: 10 TABLET | Refills: 0 | Status: SHIPPED | OUTPATIENT
Start: 2022-11-17
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
## 2022-11-17 NOTE — PATIENT INSTRUCTIONS

## 2022-11-19 LAB
BACTERIA SPEC CULT: NORMAL
SERVICE CMNT-IMP: NORMAL

## 2022-11-20 NOTE — PROGRESS NOTES
Noitfy no bacterial infection seen -stop the cipro and use the estrace cream topically biweekly as we discussed-I think this will help her sxs over time and suppressive utis

## 2022-11-21 NOTE — PROGRESS NOTES
Pt notified of neg urine culture report & PCP note to stop the cipro & use the estrace cream twice a week. Reassured this should help with her symptoms over time.

## 2023-01-10 ENCOUNTER — TELEPHONE (OUTPATIENT)
Dept: INTERNAL MEDICINE CLINIC | Age: 78
End: 2023-01-10

## 2023-01-10 DIAGNOSIS — B37.89 CANDIDIASIS OF BREAST: Primary | ICD-10-CM

## 2023-01-10 RX ORDER — NYSTATIN 100000 U/G
CREAM TOPICAL 2 TIMES DAILY
Qty: 30 G | Refills: 1 | Status: SHIPPED | OUTPATIENT
Start: 2023-01-10

## 2023-01-10 NOTE — TELEPHONE ENCOUNTER
Patient would like a call back from the nurse to discuss a rash under her breast. Patient states the rash is rapidly expanding. Patient would like a call back as there are no appointments this week and she is concerned.

## 2023-01-10 NOTE — TELEPHONE ENCOUNTER
Patient reports she has a bright red itchy rash under her right breast that has been present for about 1.5 weeks. She denies fevers & chills. Discussed sxs sound fungal & she may benefit from a trial of nystatin cream. Please advise if okay to send in order for Nystatin cream.    Discussed ways to help prevent moisture buildup under the breasts for the future.

## 2023-01-10 NOTE — TELEPHONE ENCOUNTER
PCP approved. Order e-scribed to pharmacy on file. Patient aware to give the cream at least 10 days to see the full effect. Patient voiced understanding.

## 2023-02-02 ENCOUNTER — OFFICE VISIT (OUTPATIENT)
Dept: INTERNAL MEDICINE CLINIC | Age: 78
End: 2023-02-02
Payer: MEDICARE

## 2023-02-02 VITALS
BODY MASS INDEX: 26.84 KG/M2 | SYSTOLIC BLOOD PRESSURE: 140 MMHG | HEIGHT: 66 IN | TEMPERATURE: 97.9 F | HEART RATE: 68 BPM | WEIGHT: 167 LBS | OXYGEN SATURATION: 98 % | DIASTOLIC BLOOD PRESSURE: 84 MMHG | RESPIRATION RATE: 16 BRPM

## 2023-02-02 DIAGNOSIS — N39.0 RECURRENT UTI: ICD-10-CM

## 2023-02-02 DIAGNOSIS — R30.0 BURNING WITH URINATION: Primary | ICD-10-CM

## 2023-02-02 DIAGNOSIS — I48.0 PAROXYSMAL ATRIAL FIBRILLATION (HCC): ICD-10-CM

## 2023-02-02 DIAGNOSIS — J47.9 BRONCHIECTASIS WITHOUT COMPLICATION (HCC): ICD-10-CM

## 2023-02-02 DIAGNOSIS — B37.2 CANDIDAL INTERTRIGO: ICD-10-CM

## 2023-02-02 DIAGNOSIS — R30.0 DYSURIA: ICD-10-CM

## 2023-02-02 PROCEDURE — 81001 URINALYSIS AUTO W/SCOPE: CPT | Performed by: INTERNAL MEDICINE

## 2023-02-02 PROCEDURE — G0463 HOSPITAL OUTPT CLINIC VISIT: HCPCS | Performed by: INTERNAL MEDICINE

## 2023-02-02 RX ORDER — NYSTATIN 100000 U/G
CREAM TOPICAL 2 TIMES DAILY
Qty: 15 G | Refills: 0 | Status: SHIPPED | OUTPATIENT
Start: 2023-02-02

## 2023-02-02 RX ORDER — NITROFURANTOIN 25; 75 MG/1; MG/1
100 CAPSULE ORAL 2 TIMES DAILY
Qty: 10 CAPSULE | Refills: 0 | Status: SHIPPED | OUTPATIENT
Start: 2023-02-02

## 2023-02-02 RX ORDER — ESTRADIOL 0.1 MG/G
CREAM VAGINAL
Qty: 42 G | Refills: 3 | Status: SHIPPED | OUTPATIENT
Start: 2023-02-02

## 2023-02-04 LAB
BACTERIA SPEC CULT: NORMAL
SERVICE CMNT-IMP: NORMAL

## 2023-02-05 NOTE — PROGRESS NOTES
Notify no significant infection seen on culture -safe to stop the nitrofurantoin and see urology as we discussed not emergent

## 2023-03-07 ENCOUNTER — OFFICE VISIT (OUTPATIENT)
Dept: INTERNAL MEDICINE CLINIC | Age: 78
End: 2023-03-07
Payer: MEDICARE

## 2023-03-07 VITALS
OXYGEN SATURATION: 98 % | HEIGHT: 66 IN | HEART RATE: 67 BPM | SYSTOLIC BLOOD PRESSURE: 125 MMHG | DIASTOLIC BLOOD PRESSURE: 81 MMHG | WEIGHT: 166.6 LBS | RESPIRATION RATE: 16 BRPM | BODY MASS INDEX: 26.78 KG/M2 | TEMPERATURE: 97.7 F

## 2023-03-07 DIAGNOSIS — J45.40 MODERATE PERSISTENT ASTHMA WITHOUT COMPLICATION: ICD-10-CM

## 2023-03-07 DIAGNOSIS — I10 HTN, GOAL BELOW 130/80: Primary | ICD-10-CM

## 2023-03-07 DIAGNOSIS — E78.2 MIXED HYPERLIPIDEMIA: ICD-10-CM

## 2023-03-07 DIAGNOSIS — Z01.818 PREOP EXAMINATION: ICD-10-CM

## 2023-03-07 DIAGNOSIS — M17.11 ARTHRITIS OF RIGHT KNEE: ICD-10-CM

## 2023-03-07 PROCEDURE — 1090F PRES/ABSN URINE INCON ASSESS: CPT | Performed by: INTERNAL MEDICINE

## 2023-03-07 PROCEDURE — G0463 HOSPITAL OUTPT CLINIC VISIT: HCPCS | Performed by: INTERNAL MEDICINE

## 2023-03-07 PROCEDURE — 1101F PT FALLS ASSESS-DOCD LE1/YR: CPT | Performed by: INTERNAL MEDICINE

## 2023-03-07 PROCEDURE — G8536 NO DOC ELDER MAL SCRN: HCPCS | Performed by: INTERNAL MEDICINE

## 2023-03-07 PROCEDURE — G8399 PT W/DXA RESULTS DOCUMENT: HCPCS | Performed by: INTERNAL MEDICINE

## 2023-03-07 PROCEDURE — G8417 CALC BMI ABV UP PARAM F/U: HCPCS | Performed by: INTERNAL MEDICINE

## 2023-03-07 PROCEDURE — G8432 DEP SCR NOT DOC, RNG: HCPCS | Performed by: INTERNAL MEDICINE

## 2023-03-07 PROCEDURE — G8427 DOCREV CUR MEDS BY ELIG CLIN: HCPCS | Performed by: INTERNAL MEDICINE

## 2023-03-07 PROCEDURE — 99214 OFFICE O/P EST MOD 30 MIN: CPT | Performed by: INTERNAL MEDICINE

## 2023-03-07 RX ORDER — FLUTICASONE FUROATE AND VILANTEROL 100; 25 UG/1; UG/1
1 POWDER RESPIRATORY (INHALATION) DAILY
Qty: 1 EACH | Refills: 0
Start: 2023-03-07

## 2023-03-07 RX ORDER — METOPROLOL SUCCINATE 50 MG/1
100 TABLET, EXTENDED RELEASE ORAL DAILY
COMMUNITY

## 2023-03-07 NOTE — PROGRESS NOTES
HISTORY OF PRESENT ILLNESS  Juanita Doss is a 76 y.o. female. ANTONY Lloydaniket Renae wants to discuss upcoming knee replacement surgery with Dr. Sanjuana Rousseau scheduled for March 21, 2023 at [facility].  She does not bring any preop forms and in fact has had full preadmission labs done at the hospital already. I do not have those results. Her main concern is what type of care she will need after surgery and whether she should have general or spinal anesthesia. Did discuss that spinal would have fewer long term risk. Discussed that she may well need postop care and she should discuss this further with the surgical team she will be working with. Discussed holding her vitamin supplements for four days prior to surgery. She is currently on Eliquis and will continue this up until surgery. Has not been using Breo. Advised to resume this to prevent risk of reactive airways associated with surgery. Blood pressure being managed by Dr. Yeni Coy now on Toprol 50 mg two tablets daily, losartan 50 mg two tablets daily and hydralazine 10 mg t.i.d. She feels lightheaded because it has been a long time since her blood pressure was in the 120/80 range which it is currently. No fevers or chills or changes in breathing. Review of Systems   Constitutional:  Negative for chills, fever and weight loss. Respiratory:  Negative for cough, shortness of breath and wheezing. Cardiovascular:  Positive for palpitations. Negative for chest pain, orthopnea, leg swelling and PND. Gastrointestinal:  Negative for abdominal pain, diarrhea, heartburn and nausea. Genitourinary:  Negative for dysuria. Musculoskeletal:  Negative for myalgias. Neurological:  Positive for dizziness. Negative for headaches. Physical Exam  Vitals and nursing note reviewed. Constitutional:       Appearance: She is well-developed. HENT:      Head: Normocephalic and atraumatic. Neck:      Thyroid: No thyromegaly. Vascular: No carotid bruit. Cardiovascular:      Rate and Rhythm: Normal rate and regular rhythm. Heart sounds: Normal heart sounds, S1 normal and S2 normal. No murmur heard. Pulmonary:      Effort: Pulmonary effort is normal. No respiratory distress. Breath sounds: Normal breath sounds. No wheezing or rales. Abdominal:      Palpations: Abdomen is soft. Tenderness: There is no abdominal tenderness. Musculoskeletal:      Cervical back: Normal range of motion and neck supple. Neurological:      General: No focal deficit present. Mental Status: She is alert and oriented to person, place, and time. Psychiatric:         Behavior: Behavior normal.       ASSESSMENT and PLAN  Diagnoses and all orders for this visit:    1. HTN, goal below 130/80    2. Preop examination-encouraged resume the breo now as preventive  Cont current bp  meds as well controlled  Hold vitamins for 4 days prior to knee surgery  Discuss post op care needs with surgical service  Stable for planned  surgery with dr Gaurav Driver    3. Arthritis of right knee    4. Mixed hyperlipidemia    5. Moderate persistent asthma without complication  -     fluticasone furoate-vilanteroL (Breo Ellipta) 100-25 mcg/dose inhaler; Take 1 Puff by inhalation daily.

## 2023-04-05 RX ORDER — TERCONAZOLE 4 MG/G
1 CREAM VAGINAL
Qty: 45 G | Refills: 0 | Status: SHIPPED
Start: 2023-04-05

## 2023-04-05 RX ORDER — CEPHALEXIN 500 MG/1
500 CAPSULE ORAL 3 TIMES DAILY
Qty: 15 CAPSULE | Refills: 0 | Status: SHIPPED
Start: 2023-04-05

## 2023-04-06 ENCOUNTER — TELEPHONE (OUTPATIENT)
Dept: INTERNAL MEDICINE CLINIC | Age: 78
End: 2023-04-06

## 2023-04-06 NOTE — TELEPHONE ENCOUNTER
04/06/2023--This user attempted to call patient but she did not answer, so I left a detailed message about the labs and making a 3 month appointment for a medcheck. Office call back number also left.

## 2023-04-06 NOTE — TELEPHONE ENCOUNTER
Pt is calling wanted to know if she can get lab work done for her iron levels. Pt states she can only come today due to her daughter being in town and that would be her ride here.  Please advise

## 2023-04-07 ENCOUNTER — TELEPHONE (OUTPATIENT)
Dept: INTERNAL MEDICINE CLINIC | Age: 78
End: 2023-04-07

## 2023-04-19 ENCOUNTER — OFFICE VISIT (OUTPATIENT)
Dept: INTERNAL MEDICINE CLINIC | Age: 78
End: 2023-04-19
Payer: MEDICARE

## 2023-04-19 VITALS
WEIGHT: 159.6 LBS | BODY MASS INDEX: 25.65 KG/M2 | RESPIRATION RATE: 15 BRPM | HEIGHT: 66 IN | DIASTOLIC BLOOD PRESSURE: 84 MMHG | HEART RATE: 65 BPM | TEMPERATURE: 97.8 F | OXYGEN SATURATION: 98 % | SYSTOLIC BLOOD PRESSURE: 142 MMHG

## 2023-04-19 DIAGNOSIS — N39.0 RECURRENT UTI: Primary | ICD-10-CM

## 2023-04-19 DIAGNOSIS — L65.9 HAIR LOSS: ICD-10-CM

## 2023-04-19 DIAGNOSIS — I10 PRIMARY HYPERTENSION: ICD-10-CM

## 2023-04-19 PROCEDURE — G0463 HOSPITAL OUTPT CLINIC VISIT: HCPCS | Performed by: NURSE PRACTITIONER

## 2023-04-19 PROCEDURE — 99213 OFFICE O/P EST LOW 20 MIN: CPT | Performed by: NURSE PRACTITIONER

## 2023-04-19 RX ORDER — DOXYCYCLINE HYCLATE 50 MG/1
50 CAPSULE ORAL 2 TIMES DAILY
COMMUNITY
Start: 2023-01-10

## 2023-04-19 RX ORDER — ONDANSETRON 4 MG/1
TABLET, FILM COATED ORAL
COMMUNITY
Start: 2023-03-21

## 2023-04-19 RX ORDER — DOCUSATE SODIUM 100 MG/1
CAPSULE, LIQUID FILLED ORAL
COMMUNITY
Start: 2023-03-21

## 2023-04-19 RX ORDER — TRAMADOL HYDROCHLORIDE 50 MG/1
TABLET ORAL
COMMUNITY
Start: 2023-04-03
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
## 2023-04-20 LAB
APPEARANCE UR: ABNORMAL
BACTERIA URNS QL MICRO: NEGATIVE /HPF
BILIRUB UR QL: NEGATIVE
CAOX CRY URNS QL MICRO: ABNORMAL
COLOR UR: ABNORMAL
EPITH CASTS URNS QL MICRO: ABNORMAL /LPF
GLUCOSE UR STRIP.AUTO-MCNC: NEGATIVE MG/DL
HGB UR QL STRIP: NEGATIVE
IRON SATN MFR SERPL: 19 % (ref 20–50)
IRON SERPL-MCNC: 56 UG/DL (ref 35–150)
KETONES UR QL STRIP.AUTO: NEGATIVE MG/DL
LEUKOCYTE ESTERASE UR QL STRIP.AUTO: NEGATIVE
NITRITE UR QL STRIP.AUTO: NEGATIVE
PH UR STRIP: 6 (ref 5–8)
PROT UR STRIP-MCNC: NEGATIVE MG/DL
RBC #/AREA URNS HPF: ABNORMAL /HPF (ref 0–5)
SP GR UR REFRACTOMETRY: 1.02 (ref 1–1.03)
T4 FREE SERPL-MCNC: 1.1 NG/DL (ref 0.8–1.5)
TIBC SERPL-MCNC: 293 UG/DL (ref 250–450)
TSH SERPL DL<=0.05 MIU/L-ACNC: 1.77 UIU/ML (ref 0.36–3.74)
UA: UC IF INDICATED,UAUC: ABNORMAL
UROBILINOGEN UR QL STRIP.AUTO: 0.2 EU/DL (ref 0.2–1)
WBC URNS QL MICRO: ABNORMAL /HPF (ref 0–4)

## 2023-04-20 NOTE — PROGRESS NOTES
Please inform pt urine culture did not support an UTI. Advised to keep hydrated and follow-up with urologist tomorrow as planned.  thanks

## 2023-05-22 ENCOUNTER — OFFICE VISIT (OUTPATIENT)
Age: 78
End: 2023-05-22
Payer: MEDICARE

## 2023-05-22 VITALS
HEIGHT: 66 IN | BODY MASS INDEX: 26.36 KG/M2 | RESPIRATION RATE: 16 BRPM | OXYGEN SATURATION: 96 % | WEIGHT: 164 LBS | TEMPERATURE: 97.5 F | DIASTOLIC BLOOD PRESSURE: 79 MMHG | SYSTOLIC BLOOD PRESSURE: 145 MMHG | HEART RATE: 70 BPM

## 2023-05-22 DIAGNOSIS — N39.0 RECURRENT UTI: ICD-10-CM

## 2023-05-22 DIAGNOSIS — Z79.01 CHRONIC ANTICOAGULATION: ICD-10-CM

## 2023-05-22 DIAGNOSIS — R39.15 URINARY URGENCY: ICD-10-CM

## 2023-05-22 DIAGNOSIS — I10 HTN, GOAL BELOW 130/80: ICD-10-CM

## 2023-05-22 DIAGNOSIS — L65.9 HAIR LOSS DISORDER: Primary | ICD-10-CM

## 2023-05-22 PROCEDURE — G8419 CALC BMI OUT NRM PARAM NOF/U: HCPCS | Performed by: INTERNAL MEDICINE

## 2023-05-22 PROCEDURE — 3077F SYST BP >= 140 MM HG: CPT | Performed by: INTERNAL MEDICINE

## 2023-05-22 PROCEDURE — 1123F ACP DISCUSS/DSCN MKR DOCD: CPT | Performed by: INTERNAL MEDICINE

## 2023-05-22 PROCEDURE — 3078F DIAST BP <80 MM HG: CPT | Performed by: INTERNAL MEDICINE

## 2023-05-22 PROCEDURE — G8399 PT W/DXA RESULTS DOCUMENT: HCPCS | Performed by: INTERNAL MEDICINE

## 2023-05-22 PROCEDURE — G8427 DOCREV CUR MEDS BY ELIG CLIN: HCPCS | Performed by: INTERNAL MEDICINE

## 2023-05-22 PROCEDURE — 1090F PRES/ABSN URINE INCON ASSESS: CPT | Performed by: INTERNAL MEDICINE

## 2023-05-22 PROCEDURE — 4004F PT TOBACCO SCREEN RCVD TLK: CPT | Performed by: INTERNAL MEDICINE

## 2023-05-22 PROCEDURE — 99214 OFFICE O/P EST MOD 30 MIN: CPT | Performed by: INTERNAL MEDICINE

## 2023-05-22 RX ORDER — CEPHALEXIN 250 MG/1
250 CAPSULE ORAL DAILY
Qty: 30 CAPSULE | Refills: 0 | Status: SHIPPED | OUTPATIENT
Start: 2023-05-22

## 2023-05-22 RX ORDER — ESTRADIOL 0.1 MG/G
CREAM VAGINAL
Qty: 42 G | Refills: 0
Start: 2023-05-22

## 2023-05-22 ASSESSMENT — PATIENT HEALTH QUESTIONNAIRE - PHQ9
SUM OF ALL RESPONSES TO PHQ QUESTIONS 1-9: 0
1. LITTLE INTEREST OR PLEASURE IN DOING THINGS: 0
SUM OF ALL RESPONSES TO PHQ9 QUESTIONS 1 & 2: 0
SUM OF ALL RESPONSES TO PHQ QUESTIONS 1-9: 0
2. FEELING DOWN, DEPRESSED OR HOPELESS: 0
SUM OF ALL RESPONSES TO PHQ QUESTIONS 1-9: 0
SUM OF ALL RESPONSES TO PHQ QUESTIONS 1-9: 0

## 2023-05-24 ENCOUNTER — TELEPHONE (OUTPATIENT)
Age: 78
End: 2023-05-24

## 2023-05-24 DIAGNOSIS — R19.7 WATERY DIARRHEA: Primary | ICD-10-CM

## 2023-05-31 ENCOUNTER — TELEPHONE (OUTPATIENT)
Age: 78
End: 2023-05-31

## 2023-05-31 NOTE — TELEPHONE ENCOUNTER
Returned call to patient. She questions if she still needs to go to her appt with  tomorrow. Discussed it would be good to give  an update on the plan proposed by Faye Rene. Pt states she is just very tired as she is up 3 times a night urinating & once she wakes up it is hard to go back to sleep. She will try to get some rest today & hopefully have the energy to go to 's appt. She also stated  recommended she see  Endocrine. Pt voiced her previous endo dr retired (Azra Pinedo) & she was passed over to another endo provider within the practice & she is not happy with them. 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 Pt did call & they were told they needed office notes & a referral from the provider. Pt did not have the fax number. Advised will address at her upcoming appt & have PCP document in a note the reason for the referral to endo. Pt had no further questions or concerns at this time.

## 2023-05-31 NOTE — TELEPHONE ENCOUNTER
----- Message from Rashmi Salinas sent at 5/31/2023  9:47 AM EDT -----  Subject: Message to Provider    QUESTIONS  Information for Provider? Patient wondering about seeing Dr. Esther Zamudio,   endocrinologist. Does she need a referral for this? Patient called there   and they said she needed paperwork sent. Please call patient if questions. Or let her know when referral is sent. Thank you  ---------------------------------------------------------------------------  --------------  Rahul Sandhu INFO  [phone]; OK to leave message on voicemail  ---------------------------------------------------------------------------  --------------  SCRIPT ANSWERS  Relationship to Patient?  Self

## 2023-07-05 ENCOUNTER — OFFICE VISIT (OUTPATIENT)
Age: 78
End: 2023-07-05
Payer: MEDICARE

## 2023-07-05 VITALS
SYSTOLIC BLOOD PRESSURE: 152 MMHG | RESPIRATION RATE: 16 BRPM | TEMPERATURE: 97.5 F | HEIGHT: 66 IN | BODY MASS INDEX: 26.55 KG/M2 | HEART RATE: 67 BPM | WEIGHT: 165.2 LBS | OXYGEN SATURATION: 98 % | DIASTOLIC BLOOD PRESSURE: 88 MMHG

## 2023-07-05 DIAGNOSIS — E04.1 THYROID NODULE: ICD-10-CM

## 2023-07-05 DIAGNOSIS — F41.9 ANXIETY: ICD-10-CM

## 2023-07-05 DIAGNOSIS — Z79.01 CHRONIC ANTICOAGULATION: ICD-10-CM

## 2023-07-05 DIAGNOSIS — I10 HTN, GOAL BELOW 130/80: ICD-10-CM

## 2023-07-05 DIAGNOSIS — I48.0 PAROXYSMAL ATRIAL FIBRILLATION (HCC): ICD-10-CM

## 2023-07-05 DIAGNOSIS — N39.0 RECURRENT UTI: Primary | ICD-10-CM

## 2023-07-05 DIAGNOSIS — Z87.448 HISTORY OF PROLAPSE OF BLADDER: ICD-10-CM

## 2023-07-05 PROCEDURE — G8419 CALC BMI OUT NRM PARAM NOF/U: HCPCS | Performed by: INTERNAL MEDICINE

## 2023-07-05 PROCEDURE — G8427 DOCREV CUR MEDS BY ELIG CLIN: HCPCS | Performed by: INTERNAL MEDICINE

## 2023-07-05 PROCEDURE — G8399 PT W/DXA RESULTS DOCUMENT: HCPCS | Performed by: INTERNAL MEDICINE

## 2023-07-05 PROCEDURE — 4004F PT TOBACCO SCREEN RCVD TLK: CPT | Performed by: INTERNAL MEDICINE

## 2023-07-05 PROCEDURE — 1123F ACP DISCUSS/DSCN MKR DOCD: CPT | Performed by: INTERNAL MEDICINE

## 2023-07-05 PROCEDURE — 3077F SYST BP >= 140 MM HG: CPT | Performed by: INTERNAL MEDICINE

## 2023-07-05 PROCEDURE — 99214 OFFICE O/P EST MOD 30 MIN: CPT | Performed by: INTERNAL MEDICINE

## 2023-07-05 PROCEDURE — 1090F PRES/ABSN URINE INCON ASSESS: CPT | Performed by: INTERNAL MEDICINE

## 2023-07-05 PROCEDURE — 3079F DIAST BP 80-89 MM HG: CPT | Performed by: INTERNAL MEDICINE

## 2023-07-05 RX ORDER — BUSPIRONE HYDROCHLORIDE 5 MG/1
5 TABLET ORAL 2 TIMES DAILY
Qty: 60 TABLET | Refills: 2 | Status: SHIPPED | OUTPATIENT
Start: 2023-07-05 | End: 2023-10-03

## 2023-07-05 RX ORDER — CEPHALEXIN 250 MG/1
250 CAPSULE ORAL EVERY OTHER DAY
Qty: 30 CAPSULE | Refills: 0 | Status: SHIPPED | OUTPATIENT
Start: 2023-07-05

## 2023-07-05 NOTE — PROGRESS NOTES
Kim Rebollar (:  1945) is a 76 y.o. female,Established patient, here for evaluation of the following chief complaint(s):  1 Month Follow-Up         ASSESSMENT/PLAN:  1. Recurrent UTI  -     cephALEXin (KEFLEX) 250 MG capsule; Take 1 capsule by mouth every other day, Disp-30 capsule, R-0Normal  2. Thyroid nodule  3. Chronic anticoagulation  4. Paroxysmal atrial fibrillation (HCC)  5. HTN, goal below 130/80  6. History of prolapse of bladder  7. Anxiety-suspect adding to sxs  Will start low dose buspar and appt in 2 mo on qod keflex and buspar  Keep endocrine visit in august as planned  -     busPIRone (BUSPAR) 5 MG tablet; Take 1 tablet by mouth 2 times daily, Disp-60 tablet, R-2Normal      No follow-ups on file. Subjective   SUBJECTIVE/OBJECTIVE:  HPI  1 month follow-up she was having recurrent UTIs we started Keflex 250 daily and this has done well no UTI symptoms asks if she should still see Dr. Shannon Montiel and I am encouraging this we have discussed dropping the Keflex to every other day for 2 months. 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 and does feel generally concerned about who she is seeing for endocrine and follow-up with doctors in general agreeable to trying low-dose BuSpar    Chronic anticoagulation for paroxysmal A-fib has not had bleeding    Chronic intermittent dizziness with no fevers or chills does remain on metoprolol for the A-fib    History of parathyroid resection as well as thyroid nodule describes cold intolerance has an appointment in August with endocrine wants my opinion on keeping this and I have strongly encouraged her to keep the appointment  Review of Systems       Objective   Physical Exam  Constitutional:       Appearance: Normal appearance. HENT:      Head: Normocephalic and atraumatic. Right Ear: There is impacted cerumen.    Cardiovascular:      Rate and Rhythm: Normal rate and regular

## 2023-08-08 LAB — HBA1C MFR BLD HPLC: 6.1 %

## 2023-09-12 ENCOUNTER — OFFICE VISIT (OUTPATIENT)
Age: 78
End: 2023-09-12
Payer: MEDICARE

## 2023-09-12 VITALS
HEART RATE: 63 BPM | DIASTOLIC BLOOD PRESSURE: 82 MMHG | BODY MASS INDEX: 26.42 KG/M2 | SYSTOLIC BLOOD PRESSURE: 132 MMHG | RESPIRATION RATE: 16 BRPM | WEIGHT: 164.4 LBS | HEIGHT: 66 IN | OXYGEN SATURATION: 97 % | TEMPERATURE: 97.5 F

## 2023-09-12 DIAGNOSIS — F41.9 ANXIETY: Primary | ICD-10-CM

## 2023-09-12 DIAGNOSIS — N39.0 RECURRENT UTI: ICD-10-CM

## 2023-09-12 DIAGNOSIS — J45.20 RAD (REACTIVE AIRWAY DISEASE) WITH WHEEZING, MILD INTERMITTENT, UNCOMPLICATED: ICD-10-CM

## 2023-09-12 DIAGNOSIS — Z79.01 CHRONIC ANTICOAGULATION: ICD-10-CM

## 2023-09-12 DIAGNOSIS — I10 HTN, GOAL BELOW 130/80: ICD-10-CM

## 2023-09-12 PROCEDURE — 4004F PT TOBACCO SCREEN RCVD TLK: CPT | Performed by: INTERNAL MEDICINE

## 2023-09-12 PROCEDURE — 1090F PRES/ABSN URINE INCON ASSESS: CPT | Performed by: INTERNAL MEDICINE

## 2023-09-12 PROCEDURE — G8419 CALC BMI OUT NRM PARAM NOF/U: HCPCS | Performed by: INTERNAL MEDICINE

## 2023-09-12 PROCEDURE — 3078F DIAST BP <80 MM HG: CPT | Performed by: INTERNAL MEDICINE

## 2023-09-12 PROCEDURE — 1123F ACP DISCUSS/DSCN MKR DOCD: CPT | Performed by: INTERNAL MEDICINE

## 2023-09-12 PROCEDURE — 99214 OFFICE O/P EST MOD 30 MIN: CPT | Performed by: INTERNAL MEDICINE

## 2023-09-12 PROCEDURE — G8399 PT W/DXA RESULTS DOCUMENT: HCPCS | Performed by: INTERNAL MEDICINE

## 2023-09-12 PROCEDURE — 3075F SYST BP GE 130 - 139MM HG: CPT | Performed by: INTERNAL MEDICINE

## 2023-09-12 PROCEDURE — G8427 DOCREV CUR MEDS BY ELIG CLIN: HCPCS | Performed by: INTERNAL MEDICINE

## 2023-09-12 RX ORDER — DOXYCYCLINE HYCLATE 50 MG/1
50 CAPSULE ORAL DAILY
COMMUNITY
End: 2023-09-12 | Stop reason: ALTCHOICE

## 2023-09-12 RX ORDER — CEPHALEXIN 250 MG/1
250 CAPSULE ORAL EVERY OTHER DAY
Qty: 30 CAPSULE | Refills: 1 | Status: SHIPPED | OUTPATIENT
Start: 2023-09-12 | End: 2024-01-10

## 2023-09-12 RX ORDER — FLUTICASONE FUROATE AND VILANTEROL 100; 25 UG/1; UG/1
1 POWDER RESPIRATORY (INHALATION) DAILY
Qty: 3 EACH | Refills: 1 | Status: SHIPPED | OUTPATIENT
Start: 2023-09-12

## 2023-09-12 NOTE — PROGRESS NOTES
Manolo Dempsey (:  1945) is a 78 y.o. female,Established patient, here for evaluation of the following chief complaint(s): Anxiety         ASSESSMENT/PLAN:  1. Anxiety-I suspect adding to sxs of fatigue  Advised inc to bid buspar 5 mg  2. HTN, goal below 130/80-cont meds  3. Recurrent UTI-did well on suppressive abx this summer  Resume and see me in 3 months  Discussed red flag sxs  -     cephALEXin (KEFLEX) 250 MG capsule; Take 1 capsule by mouth every other day, Disp-30 capsule, R-1Normal  4. Chronic anticoagulation  5. RAD (reactive airway disease) with wheezing, mild intermittent, uncomplicated  -     fluticasone furoate-vilanterol (BREO ELLIPTA) 100-25 MCG/ACT inhaler; Inhale 1 puff into the lungs daily, Disp-3 each, R-1Print      Return in about 3 months (around 2023). Subjective   SUBJECTIVE/OBJECTIVE:  Anxiety          2-month follow-up. She did use Keflex 250 every other day. While on it she says she felt better. However ran out recently and is now feeling more urinary frequency at night and urgency. No fevers. No dysuria. She was seen by urology and they gave her methenamine. Unfortunately it caused facial swelling and she had to stop it. Hypertension remains on hydralazine losartan and Toprol. Blood pressure labile but after sitting comes down. She does endorse fatigue. Anxiety I had written BuSpar 5 mg twice daily. She has been taking it once a day. No noted side effects. Does think perhaps overall feels better. Encouraged to increase to the twice daily dosing. Reactive airways currently not struggling. Did write for Breo to have should she get a cold or any respiratory symptoms. Review of Systems       Objective   Physical Exam  Constitutional:       Appearance: Normal appearance. HENT:      Head: Normocephalic and atraumatic. Cardiovascular:      Rate and Rhythm: Normal rate and regular rhythm.    Pulmonary:      Effort: Pulmonary effort is normal.

## 2023-09-12 NOTE — PATIENT INSTRUCTIONS
Please use the buspar 5 mg twice a day  Please use the keflex every other day until our next visit in 3 months

## 2023-10-16 DIAGNOSIS — J45.20 RAD (REACTIVE AIRWAY DISEASE) WITH WHEEZING, MILD INTERMITTENT, UNCOMPLICATED: ICD-10-CM

## 2023-10-16 RX ORDER — FLUTICASONE FUROATE AND VILANTEROL 100; 25 UG/1; UG/1
1 POWDER RESPIRATORY (INHALATION) DAILY
Qty: 180 EACH | Refills: 3 | Status: SHIPPED | OUTPATIENT
Start: 2023-10-16

## 2023-12-12 ENCOUNTER — OFFICE VISIT (OUTPATIENT)
Age: 78
End: 2023-12-12
Payer: MEDICARE

## 2023-12-12 VITALS
HEIGHT: 66 IN | BODY MASS INDEX: 26.33 KG/M2 | SYSTOLIC BLOOD PRESSURE: 154 MMHG | OXYGEN SATURATION: 97 % | DIASTOLIC BLOOD PRESSURE: 89 MMHG | HEART RATE: 69 BPM | RESPIRATION RATE: 16 BRPM | TEMPERATURE: 98.1 F | WEIGHT: 163.8 LBS

## 2023-12-12 DIAGNOSIS — F41.9 ANXIETY: Primary | ICD-10-CM

## 2023-12-12 DIAGNOSIS — J45.40 MODERATE PERSISTENT ASTHMA WITHOUT COMPLICATION: ICD-10-CM

## 2023-12-12 DIAGNOSIS — Z23 NEED FOR PROPHYLACTIC VACCINATION AGAINST STREPTOCOCCUS PNEUMONIAE (PNEUMOCOCCUS): ICD-10-CM

## 2023-12-12 DIAGNOSIS — I10 HTN, GOAL BELOW 130/80: ICD-10-CM

## 2023-12-12 PROCEDURE — 1090F PRES/ABSN URINE INCON ASSESS: CPT | Performed by: INTERNAL MEDICINE

## 2023-12-12 PROCEDURE — PBSHW PNEUMOCOCCAL, PCV20, PREVNAR 20, (AGE 6W+), IM, PF: Performed by: INTERNAL MEDICINE

## 2023-12-12 PROCEDURE — G8419 CALC BMI OUT NRM PARAM NOF/U: HCPCS | Performed by: INTERNAL MEDICINE

## 2023-12-12 PROCEDURE — G8484 FLU IMMUNIZE NO ADMIN: HCPCS | Performed by: INTERNAL MEDICINE

## 2023-12-12 PROCEDURE — 99214 OFFICE O/P EST MOD 30 MIN: CPT | Performed by: INTERNAL MEDICINE

## 2023-12-12 PROCEDURE — 1123F ACP DISCUSS/DSCN MKR DOCD: CPT | Performed by: INTERNAL MEDICINE

## 2023-12-12 PROCEDURE — 4004F PT TOBACCO SCREEN RCVD TLK: CPT | Performed by: INTERNAL MEDICINE

## 2023-12-12 PROCEDURE — G8399 PT W/DXA RESULTS DOCUMENT: HCPCS | Performed by: INTERNAL MEDICINE

## 2023-12-12 PROCEDURE — 3079F DIAST BP 80-89 MM HG: CPT | Performed by: INTERNAL MEDICINE

## 2023-12-12 PROCEDURE — 90677 PCV20 VACCINE IM: CPT | Performed by: INTERNAL MEDICINE

## 2023-12-12 PROCEDURE — 3077F SYST BP >= 140 MM HG: CPT | Performed by: INTERNAL MEDICINE

## 2023-12-12 PROCEDURE — G8427 DOCREV CUR MEDS BY ELIG CLIN: HCPCS | Performed by: INTERNAL MEDICINE

## 2023-12-12 RX ORDER — AZITHROMYCIN 250 MG/1
250 TABLET, FILM COATED ORAL SEE ADMIN INSTRUCTIONS
Qty: 6 TABLET | Refills: 0 | Status: SHIPPED | OUTPATIENT
Start: 2023-12-12 | End: 2023-12-17

## 2023-12-12 RX ORDER — ALBUTEROL SULFATE 90 UG/1
2 AEROSOL, METERED RESPIRATORY (INHALATION) 4 TIMES DAILY PRN
Qty: 18 G | Refills: 0 | Status: SHIPPED | OUTPATIENT
Start: 2023-12-12

## 2023-12-12 RX ORDER — BUSPIRONE HYDROCHLORIDE 5 MG/1
5 TABLET ORAL 2 TIMES DAILY
Qty: 60 TABLET | Refills: 5 | Status: SHIPPED | OUTPATIENT
Start: 2023-12-12 | End: 2024-06-09

## 2023-12-12 NOTE — PROGRESS NOTES
Patient present for routine immunizations. Pt denies any symptoms , reactions or allergies that would exclude them from being immunized today. Risks and adverse reactions were discussed and the VIS was given to them. All questions were addressed. Pt was observed for 10 min post injection. There were no reactions observed.     Felicia Pepper LPN

## 2023-12-12 NOTE — PROGRESS NOTES
Misha Lira (:  1945) is a 78 y.o. female,Established patient, here for evaluation of the following chief complaint(s):  3 Month Follow-Up, Anxiety, and Discuss Medications         ASSESSMENT/PLAN:  1. Anxiety-encouraged use bid  -     busPIRone (BUSPAR) 5 MG tablet; Take 1 tablet by mouth 2 times daily, Disp-60 tablet, R-5Normal  2. HTN, goal below 130/80-cont meds  3. Moderate persistent asthma without complication-cont breo and prn albuterol  Zpak to have at home if sxs of bacterial infection develop currently none  -     azithromycin (ZITHROMAX) 250 MG tablet; Take 1 tablet by mouth See Admin Instructions for 5 days 500mg on day 1 followed by 250mg on days 2 - 5, Disp-6 tablet, R-0Normal  -     albuterol sulfate HFA (VENTOLIN HFA) 108 (90 Base) MCG/ACT inhaler; Inhale 2 puffs into the lungs 4 times daily as needed for Wheezing, Disp-18 g, R-0Normal  4. Need for prophylactic vaccination against Streptococcus pneumoniae (pneumococcus)  -     Pneumococcal, PCV20, PREVNAR 21, (age 25 yrs+), IM, PF      No follow-ups on file. Subjective   SUBJECTIVE/OBJECTIVE:  Anxiety          Seen for med check. We had discussed bumping BuSpar to 5 mg twice daily she has been using it really just once a day feels that it might make her tired but still has a lot of stress at home and encouraged her to bump to the twice daily    Hypertension on Toprol 50 mg 2 tabs losartan 50 mg 2 tabs hydralazine 3 times daily blood pressure looks reasonably controlled at home it is higher here she is not having significant headaches or chest pain    Asthma has been coughing no some congestion no fevers or chills no pleuritic pain does use Breo needs a new albuterol  Review of Systems       Objective   Physical Exam  Constitutional:       Appearance: Normal appearance. HENT:      Head: Normocephalic and atraumatic. Cardiovascular:      Rate and Rhythm: Normal rate and regular rhythm.    Pulmonary:      Effort: Pulmonary

## 2023-12-14 ENCOUNTER — TELEPHONE (OUTPATIENT)
Age: 78
End: 2023-12-14

## 2023-12-14 ENCOUNTER — HOSPITAL ENCOUNTER (OUTPATIENT)
Facility: HOSPITAL | Age: 78
Discharge: HOME | End: 2023-12-14
Payer: MEDICARE

## 2023-12-14 ENCOUNTER — OFFICE VISIT (OUTPATIENT)
Age: 78
End: 2023-12-14
Payer: MEDICARE

## 2023-12-14 VITALS
SYSTOLIC BLOOD PRESSURE: 129 MMHG | RESPIRATION RATE: 16 BRPM | OXYGEN SATURATION: 96 % | HEIGHT: 66 IN | DIASTOLIC BLOOD PRESSURE: 72 MMHG | TEMPERATURE: 98.5 F | BODY MASS INDEX: 25.68 KG/M2 | WEIGHT: 159.8 LBS | HEART RATE: 75 BPM

## 2023-12-14 DIAGNOSIS — I10 HYPERTENSION, UNSPECIFIED TYPE: ICD-10-CM

## 2023-12-14 DIAGNOSIS — J06.9 UPPER RESPIRATORY TRACT INFECTION, UNSPECIFIED TYPE: ICD-10-CM

## 2023-12-14 DIAGNOSIS — J06.9 UPPER RESPIRATORY TRACT INFECTION, UNSPECIFIED TYPE: Primary | ICD-10-CM

## 2023-12-14 LAB
INFLUENZA A ANTIGEN, POC: NEGATIVE
INFLUENZA B ANTIGEN, POC: NEGATIVE
LOT EXPIRE DATE: NORMAL
LOT KIT NUMBER: NORMAL
SARS-COV-2, POC: NORMAL
VALID INTERNAL CONTROL, POC: YES
VALID INTERNAL CONTROL: NORMAL
VENDOR AND KIT NAME POC: NORMAL

## 2023-12-14 PROCEDURE — PBSHW AMB POC SOFIA INFLUENZA A/B TEST: Performed by: NURSE PRACTITIONER

## 2023-12-14 PROCEDURE — PBSHW AMB POC SARS-COV-2: Performed by: NURSE PRACTITIONER

## 2023-12-14 PROCEDURE — G8427 DOCREV CUR MEDS BY ELIG CLIN: HCPCS | Performed by: NURSE PRACTITIONER

## 2023-12-14 PROCEDURE — G8419 CALC BMI OUT NRM PARAM NOF/U: HCPCS | Performed by: NURSE PRACTITIONER

## 2023-12-14 PROCEDURE — 1036F TOBACCO NON-USER: CPT | Performed by: NURSE PRACTITIONER

## 2023-12-14 PROCEDURE — 1123F ACP DISCUSS/DSCN MKR DOCD: CPT | Performed by: NURSE PRACTITIONER

## 2023-12-14 PROCEDURE — 1090F PRES/ABSN URINE INCON ASSESS: CPT | Performed by: NURSE PRACTITIONER

## 2023-12-14 PROCEDURE — G8399 PT W/DXA RESULTS DOCUMENT: HCPCS | Performed by: NURSE PRACTITIONER

## 2023-12-14 PROCEDURE — G8484 FLU IMMUNIZE NO ADMIN: HCPCS | Performed by: NURSE PRACTITIONER

## 2023-12-14 PROCEDURE — 99214 OFFICE O/P EST MOD 30 MIN: CPT | Performed by: NURSE PRACTITIONER

## 2023-12-14 PROCEDURE — 3078F DIAST BP <80 MM HG: CPT | Performed by: NURSE PRACTITIONER

## 2023-12-14 PROCEDURE — 3074F SYST BP LT 130 MM HG: CPT | Performed by: NURSE PRACTITIONER

## 2023-12-14 PROCEDURE — 87804 INFLUENZA ASSAY W/OPTIC: CPT | Performed by: NURSE PRACTITIONER

## 2023-12-14 PROCEDURE — 71046 X-RAY EXAM CHEST 2 VIEWS: CPT

## 2023-12-14 PROCEDURE — 87426 SARSCOV CORONAVIRUS AG IA: CPT | Performed by: NURSE PRACTITIONER

## 2023-12-14 RX ORDER — AMOXICILLIN AND CLAVULANATE POTASSIUM 875; 125 MG/1; MG/1
1 TABLET, FILM COATED ORAL 2 TIMES DAILY
Qty: 14 TABLET | Refills: 0 | Status: SHIPPED | OUTPATIENT
Start: 2023-12-14 | End: 2023-12-21

## 2023-12-14 RX ORDER — BENZONATATE 100 MG/1
100-200 CAPSULE ORAL 3 TIMES DAILY PRN
Qty: 21 CAPSULE | Refills: 0 | Status: SHIPPED | OUTPATIENT
Start: 2023-12-14 | End: 2023-12-21

## 2023-12-14 NOTE — TELEPHONE ENCOUNTER
----- Message from Lubna Little sent at 12/14/2023  8:23 AM EST -----  Subject: Appointment Request    Reason for Call: Established Patient Appointment needed: Routine Existing   Condition Follow Up    QUESTIONS    Reason for appointment request? No appointments available during search     Additional Information for Provider? Pt is needing to be seen by the   provider asap. Pt is stating that the medication prescribed for the her is   not helping and her symptoms has worsened.  If the practice could give the   pt a call asap to schedule.  ---------------------------------------------------------------------------  --------------  Jovan Sarmientoant INFO  [phone]; OK to leave message on voicemail  ---------------------------------------------------------------------------  --------------  SCRIPT ANSWERS

## 2023-12-14 NOTE — TELEPHONE ENCOUNTER
Patient was sent through to the office via the nurse triage line c/o cough/cold symptoms and dizziness.   Provided in office appt today with Annette at 10:00am.
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
## 2024-02-13 DIAGNOSIS — J45.40 MODERATE PERSISTENT ASTHMA WITHOUT COMPLICATION: ICD-10-CM

## 2024-02-13 DIAGNOSIS — J06.9 UPPER RESPIRATORY TRACT INFECTION, UNSPECIFIED TYPE: ICD-10-CM

## 2024-02-13 RX ORDER — ALBUTEROL SULFATE 90 UG/1
2 AEROSOL, METERED RESPIRATORY (INHALATION) 4 TIMES DAILY PRN
Qty: 6.7 G | Refills: 5 | Status: SHIPPED | OUTPATIENT
Start: 2024-02-13

## 2024-03-13 ENCOUNTER — OFFICE VISIT (OUTPATIENT)
Age: 79
End: 2024-03-13
Payer: MEDICARE

## 2024-03-13 VITALS
WEIGHT: 166.4 LBS | SYSTOLIC BLOOD PRESSURE: 154 MMHG | HEIGHT: 66 IN | TEMPERATURE: 97.8 F | BODY MASS INDEX: 26.74 KG/M2 | RESPIRATION RATE: 16 BRPM | DIASTOLIC BLOOD PRESSURE: 84 MMHG | OXYGEN SATURATION: 96 % | HEART RATE: 70 BPM

## 2024-03-13 DIAGNOSIS — J47.9 BRONCHIECTASIS, UNCOMPLICATED (HCC): ICD-10-CM

## 2024-03-13 DIAGNOSIS — I10 HTN, GOAL BELOW 130/80: Primary | ICD-10-CM

## 2024-03-13 DIAGNOSIS — B37.31 VAGINAL CANDIDIASIS: ICD-10-CM

## 2024-03-13 DIAGNOSIS — Z79.01 CHRONIC ANTICOAGULATION: ICD-10-CM

## 2024-03-13 DIAGNOSIS — I48.0 PAROXYSMAL ATRIAL FIBRILLATION (HCC): ICD-10-CM

## 2024-03-13 DIAGNOSIS — F41.9 ANXIETY: ICD-10-CM

## 2024-03-13 PROCEDURE — 1036F TOBACCO NON-USER: CPT | Performed by: INTERNAL MEDICINE

## 2024-03-13 PROCEDURE — 99214 OFFICE O/P EST MOD 30 MIN: CPT | Performed by: INTERNAL MEDICINE

## 2024-03-13 PROCEDURE — G8399 PT W/DXA RESULTS DOCUMENT: HCPCS | Performed by: INTERNAL MEDICINE

## 2024-03-13 PROCEDURE — 3077F SYST BP >= 140 MM HG: CPT | Performed by: INTERNAL MEDICINE

## 2024-03-13 PROCEDURE — G8427 DOCREV CUR MEDS BY ELIG CLIN: HCPCS | Performed by: INTERNAL MEDICINE

## 2024-03-13 PROCEDURE — G8484 FLU IMMUNIZE NO ADMIN: HCPCS | Performed by: INTERNAL MEDICINE

## 2024-03-13 PROCEDURE — 1123F ACP DISCUSS/DSCN MKR DOCD: CPT | Performed by: INTERNAL MEDICINE

## 2024-03-13 PROCEDURE — 3079F DIAST BP 80-89 MM HG: CPT | Performed by: INTERNAL MEDICINE

## 2024-03-13 PROCEDURE — G8419 CALC BMI OUT NRM PARAM NOF/U: HCPCS | Performed by: INTERNAL MEDICINE

## 2024-03-13 PROCEDURE — 1090F PRES/ABSN URINE INCON ASSESS: CPT | Performed by: INTERNAL MEDICINE

## 2024-03-13 RX ORDER — VALSARTAN 160 MG/1
160 TABLET ORAL DAILY
Qty: 90 TABLET | Refills: 1 | Status: SHIPPED | OUTPATIENT
Start: 2024-03-13

## 2024-03-13 ASSESSMENT — PATIENT HEALTH QUESTIONNAIRE - PHQ9
SUM OF ALL RESPONSES TO PHQ QUESTIONS 1-9: 0
2. FEELING DOWN, DEPRESSED OR HOPELESS: 0
SUM OF ALL RESPONSES TO PHQ QUESTIONS 1-9: 0
SUM OF ALL RESPONSES TO PHQ9 QUESTIONS 1 & 2: 0
SUM OF ALL RESPONSES TO PHQ QUESTIONS 1-9: 0
1. LITTLE INTEREST OR PLEASURE IN DOING THINGS: 0
SUM OF ALL RESPONSES TO PHQ QUESTIONS 1-9: 0

## 2024-03-13 NOTE — PROGRESS NOTES
Martha Morrison (:  1945) is a 78 y.o. female,Established patient, here for evaluation of the following chief complaint(s):  Anxiety and Hypertension (Patient is nonfasting)         ASSESSMENT/PLAN:  1. HTN, goal below 130/80-not fully controlled advised change from losartan 50 mg bid to valsartan 160 qd  Appt in 3mo  -     valsartan (DIOVAN) 160 MG tablet; Take 1 tablet by mouth daily Diovan recommended, Disp-90 tablet, R-1, DAWPrint  2. Paroxysmal atrial fibrillation (HCC)  3. Bronchiectasis, uncomplicated (HCC)  4. Anxiety  5. Chronic anticoagulation-cont eliquis  6. Vaginal candidiasis  -     terconazole (TERAZOL 7) 0.4 % vaginal cream; Place vaginally nightly for 3 nights prn itch, Disp-45 g, R-1Normal      No follow-ups on file.         Subjective   SUBJECTIVE/OBJECTIVE:  Anxiety        Hypertension  Associated symptoms include anxiety.     Seen at follow-up.  She notes occasional headaches but overall is feeling generally well.  For blood pressure currently on Toprol  mg losartan 50 mg twice daily and hydralazine 10 mg 2-1/2 tabs twice daily.  Blood pressure is running a bit high and I am suggesting we changed the losartan to Diovan 160 daily.    Anxiety notes that the BuSpar 5 has really helped and there are times when she just does not feel anxious which has been a change.    Intermittent vaginal itch requests Terazol which has been useful.    Bronchiectasis did have RSV this winter and it took a while to clear notes some residual cough and some brain fog although much improved.  Review of Systems       Objective   Physical Exam  Constitutional:       Appearance: Normal appearance.   HENT:      Head: Normocephalic and atraumatic.   Cardiovascular:      Rate and Rhythm: Normal rate and regular rhythm.   Pulmonary:      Effort: Pulmonary effort is normal.      Breath sounds: Normal breath sounds.   Musculoskeletal:      Right lower leg: No edema.      Left lower leg: No edema.   Neurological:

## 2024-06-04 ENCOUNTER — OFFICE VISIT (OUTPATIENT)
Age: 79
End: 2024-06-04
Payer: MEDICARE

## 2024-06-04 VITALS
DIASTOLIC BLOOD PRESSURE: 79 MMHG | TEMPERATURE: 98.1 F | RESPIRATION RATE: 16 BRPM | WEIGHT: 167 LBS | BODY MASS INDEX: 26.84 KG/M2 | HEIGHT: 66 IN | OXYGEN SATURATION: 94 % | SYSTOLIC BLOOD PRESSURE: 136 MMHG | HEART RATE: 61 BPM

## 2024-06-04 DIAGNOSIS — I10 HTN, GOAL BELOW 130/80: Primary | ICD-10-CM

## 2024-06-04 DIAGNOSIS — F41.9 ANXIETY: ICD-10-CM

## 2024-06-04 DIAGNOSIS — M48.00 SPINAL STENOSIS, UNSPECIFIED SPINAL REGION: ICD-10-CM

## 2024-06-04 DIAGNOSIS — I10 HTN, GOAL BELOW 130/80: ICD-10-CM

## 2024-06-04 LAB
ALBUMIN SERPL-MCNC: 3.7 G/DL (ref 3.5–5)
ALBUMIN/GLOB SERPL: 1 (ref 1.1–2.2)
ALP SERPL-CCNC: 71 U/L (ref 45–117)
ALT SERPL-CCNC: 24 U/L (ref 12–78)
ANION GAP SERPL CALC-SCNC: 4 MMOL/L (ref 5–15)
AST SERPL-CCNC: 16 U/L (ref 15–37)
BILIRUB SERPL-MCNC: 0.5 MG/DL (ref 0.2–1)
BUN SERPL-MCNC: 22 MG/DL (ref 6–20)
BUN/CREAT SERPL: 27 (ref 12–20)
CALCIUM SERPL-MCNC: 9.3 MG/DL (ref 8.5–10.1)
CHLORIDE SERPL-SCNC: 101 MMOL/L (ref 97–108)
CO2 SERPL-SCNC: 31 MMOL/L (ref 21–32)
CREAT SERPL-MCNC: 0.82 MG/DL (ref 0.55–1.02)
GLOBULIN SER CALC-MCNC: 3.7 G/DL (ref 2–4)
GLUCOSE SERPL-MCNC: 92 MG/DL (ref 65–100)
POTASSIUM SERPL-SCNC: 4.2 MMOL/L (ref 3.5–5.1)
PROT SERPL-MCNC: 7.4 G/DL (ref 6.4–8.2)
SODIUM SERPL-SCNC: 136 MMOL/L (ref 136–145)

## 2024-06-04 PROCEDURE — 3078F DIAST BP <80 MM HG: CPT | Performed by: INTERNAL MEDICINE

## 2024-06-04 PROCEDURE — G8428 CUR MEDS NOT DOCUMENT: HCPCS | Performed by: INTERNAL MEDICINE

## 2024-06-04 PROCEDURE — 99213 OFFICE O/P EST LOW 20 MIN: CPT | Performed by: INTERNAL MEDICINE

## 2024-06-04 PROCEDURE — 3075F SYST BP GE 130 - 139MM HG: CPT | Performed by: INTERNAL MEDICINE

## 2024-06-04 PROCEDURE — G8419 CALC BMI OUT NRM PARAM NOF/U: HCPCS | Performed by: INTERNAL MEDICINE

## 2024-06-04 PROCEDURE — G8399 PT W/DXA RESULTS DOCUMENT: HCPCS | Performed by: INTERNAL MEDICINE

## 2024-06-04 PROCEDURE — 1123F ACP DISCUSS/DSCN MKR DOCD: CPT | Performed by: INTERNAL MEDICINE

## 2024-06-04 PROCEDURE — 1090F PRES/ABSN URINE INCON ASSESS: CPT | Performed by: INTERNAL MEDICINE

## 2024-06-04 PROCEDURE — 1036F TOBACCO NON-USER: CPT | Performed by: INTERNAL MEDICINE

## 2024-06-04 RX ORDER — LOSARTAN POTASSIUM 100 MG/1
100 TABLET ORAL DAILY
COMMUNITY

## 2024-06-04 RX ORDER — HYDRALAZINE HYDROCHLORIDE 25 MG/1
25 TABLET, FILM COATED ORAL 2 TIMES DAILY
COMMUNITY

## 2024-06-04 SDOH — ECONOMIC STABILITY: FOOD INSECURITY: WITHIN THE PAST 12 MONTHS, THE FOOD YOU BOUGHT JUST DIDN'T LAST AND YOU DIDN'T HAVE MONEY TO GET MORE.: NEVER TRUE

## 2024-06-04 SDOH — ECONOMIC STABILITY: HOUSING INSECURITY
IN THE LAST 12 MONTHS, WAS THERE A TIME WHEN YOU DID NOT HAVE A STEADY PLACE TO SLEEP OR SLEPT IN A SHELTER (INCLUDING NOW)?: NO

## 2024-06-04 SDOH — ECONOMIC STABILITY: INCOME INSECURITY: HOW HARD IS IT FOR YOU TO PAY FOR THE VERY BASICS LIKE FOOD, HOUSING, MEDICAL CARE, AND HEATING?: NOT HARD AT ALL

## 2024-06-04 SDOH — ECONOMIC STABILITY: FOOD INSECURITY: WITHIN THE PAST 12 MONTHS, YOU WORRIED THAT YOUR FOOD WOULD RUN OUT BEFORE YOU GOT MONEY TO BUY MORE.: NEVER TRUE

## 2024-06-04 NOTE — PROGRESS NOTES
Martha Morrison (:  1945) is a 78 y.o. female,Established patient, here for evaluation of the following chief complaint(s):  Hypertension (3 month follow up)      Assessment & Plan   1. HTN, goal below 130/80-improved with losartan and hydralazine and toprol  Cont meds  -     Comprehensive Metabolic Panel; Future  2. Spinal stenosis, unspecified spinal region-note written indicating need for new mattress  3. Anxiety-better with buspar 5 mg qd can inc to bid prn      No follow-ups on file.       Subjective   Hypertension      Since our last visit Dr. garcía changed her blood pressure regimen.  She is now on increased dose of hydralazine 20 5 in the morning and 50 at night and back on losartan 100 mg daily.  Has a little bit of edema but is not severe.  Does feel it is helping her blood pressure.    Spinal stenosis purchased a new mattress was very expensive and her  has mentioned that if I write a note that she does have spinal stenosis and the mattress is helpful for her back this would help with tax right I am happy to write this note.    Anxiety notes that she is taking the BuSpar 5 mg only once a day and feels this has been helpful aware that she could use twice a day if needed  Review of Systems       Objective   Physical Exam  Constitutional:       Appearance: Normal appearance.   HENT:      Head: Normocephalic and atraumatic.   Cardiovascular:      Rate and Rhythm: Normal rate and regular rhythm.   Pulmonary:      Effort: Pulmonary effort is normal.      Breath sounds: Normal breath sounds.   Musculoskeletal:      Right lower leg: No edema.      Left lower leg: No edema.   Neurological:      General: No focal deficit present.      Mental Status: She is alert and oriented to person, place, and time.   Psychiatric:         Behavior: Behavior normal.                  An electronic signature was used to authenticate this note.    --Yoselin Ball MD

## 2024-08-13 LAB
ALBUMIN: 4.2 G/DL
ALP BLD-CCNC: 69 U/L
ALT SERPL-CCNC: 13 U/L
ANION GAP SERPL CALCULATED.3IONS-SCNC: NORMAL MMOL/L
AST SERPL-CCNC: 11 U/L
BILIRUB SERPL-MCNC: 0.4 MG/DL (ref 0.1–1.4)
BUN BLDV-MCNC: 23 MG/DL
CALCIUM SERPL-MCNC: 9.5 MG/DL
CHLORIDE BLD-SCNC: 99 MMOL/L
CHOLESTEROL, TOTAL: 228 MG/DL
CHOLESTEROL/HDL RATIO: NORMAL
CO2: 25 MMOL/L
CREAT SERPL-MCNC: 0.84 MG/DL
ESTIMATED AVERAGE GLUCOSE: NORMAL
GFR, ESTIMATED: 70
GLUCOSE BLD-MCNC: 92 MG/DL
HBA1C MFR BLD: 6.2 %
HDLC SERPL-MCNC: 53 MG/DL (ref 35–70)
LDL CHOLESTEROL: 143
NONHDLC SERPL-MCNC: NORMAL MG/DL
POTASSIUM SERPL-SCNC: 4.4 MMOL/L
SODIUM BLD-SCNC: 138 MMOL/L
TOTAL PROTEIN: 6.8 G/DL (ref 6.4–8.2)
TRIGL SERPL-MCNC: 181 MG/DL
TSH SERPL DL<=0.05 MIU/L-ACNC: 2.72 UIU/ML
VITAMIN D 25-HYDROXY: 47
VITAMIN D2, 25 HYDROXY: NORMAL
VITAMIN D3,25 HYDROXY: NORMAL
VLDLC SERPL CALC-MCNC: 32 MG/DL

## 2024-08-26 NOTE — PATIENT DISCUSSION
1.  Early Chalazion VIRGIE improving - Cont Hot compresses TID x 5 minutes for 3 weeks. Reduce Doxycycline 50mg po to Qdaily and use till out. If without improvement discussed with patient possible Incision and Drainage procedure. Risks and benefits discussed with patient and patient states full understanding. 2.  Severe Anterior and Posterior Blepharitis OU - Continue Tobradex ana maría applied to lids OU daily. Compliance with gtts/ compresses & eyelid scrubs discussed. Increase Hot compresses to TID x 5 minutes continue Ocusoft Hypochlor Lid Spray PRN QD to eyelids. Cont Tobradex ANA MARÍA to lids PRN. Discussed with patient could consider Cliradex Lid Wipes PRN. 3.  SYDNI w/ PEK OU- Controlled. Cont ATs TID OU routinely 4. Papilloma RUL / RLL (appears benign) -- 5. 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 H/o GR I Athero Vascular Dz OU 7. H/o PVD OU8. H/o I&D LLL ChalazionReturn for an appointment in 2 mo 10 with Dr. Jessie Dee.
Anterior Blepharitis OU - Daily Hot compresses and lid scrubs were recommended.
None known

## 2024-09-11 ENCOUNTER — OFFICE VISIT (OUTPATIENT)
Age: 79
End: 2024-09-11
Payer: MEDICARE

## 2024-09-11 VITALS
HEIGHT: 66 IN | TEMPERATURE: 97.7 F | SYSTOLIC BLOOD PRESSURE: 150 MMHG | HEART RATE: 62 BPM | WEIGHT: 165 LBS | BODY MASS INDEX: 26.52 KG/M2 | DIASTOLIC BLOOD PRESSURE: 84 MMHG | OXYGEN SATURATION: 94 % | RESPIRATION RATE: 16 BRPM

## 2024-09-11 DIAGNOSIS — J45.40 MODERATE PERSISTENT ASTHMA WITHOUT COMPLICATION: Primary | ICD-10-CM

## 2024-09-11 DIAGNOSIS — R73.03 PREDIABETES: ICD-10-CM

## 2024-09-11 DIAGNOSIS — M48.00 SPINAL STENOSIS, UNSPECIFIED SPINAL REGION: ICD-10-CM

## 2024-09-11 DIAGNOSIS — I10 HTN, GOAL BELOW 130/80: ICD-10-CM

## 2024-09-11 DIAGNOSIS — F41.9 ANXIETY: ICD-10-CM

## 2024-09-11 DIAGNOSIS — Z23 NEEDS FLU SHOT: ICD-10-CM

## 2024-09-11 PROCEDURE — 99214 OFFICE O/P EST MOD 30 MIN: CPT | Performed by: INTERNAL MEDICINE

## 2024-09-11 PROCEDURE — G8427 DOCREV CUR MEDS BY ELIG CLIN: HCPCS | Performed by: INTERNAL MEDICINE

## 2024-09-11 PROCEDURE — 1123F ACP DISCUSS/DSCN MKR DOCD: CPT | Performed by: INTERNAL MEDICINE

## 2024-09-11 PROCEDURE — G8419 CALC BMI OUT NRM PARAM NOF/U: HCPCS | Performed by: INTERNAL MEDICINE

## 2024-09-11 PROCEDURE — 3077F SYST BP >= 140 MM HG: CPT | Performed by: INTERNAL MEDICINE

## 2024-09-11 PROCEDURE — PBSHW INFLUENZA, FLUAD TRIVALENT, (AGE 65 Y+), IM, PRESERVATIVE FREE, 0.5ML: Performed by: INTERNAL MEDICINE

## 2024-09-11 PROCEDURE — 1090F PRES/ABSN URINE INCON ASSESS: CPT | Performed by: INTERNAL MEDICINE

## 2024-09-11 PROCEDURE — 90653 IIV ADJUVANT VACCINE IM: CPT | Performed by: INTERNAL MEDICINE

## 2024-09-11 PROCEDURE — 1036F TOBACCO NON-USER: CPT | Performed by: INTERNAL MEDICINE

## 2024-09-11 PROCEDURE — 3079F DIAST BP 80-89 MM HG: CPT | Performed by: INTERNAL MEDICINE

## 2024-09-11 PROCEDURE — G8399 PT W/DXA RESULTS DOCUMENT: HCPCS | Performed by: INTERNAL MEDICINE

## 2024-09-11 RX ORDER — TERCONAZOLE 8 MG/G
CREAM VAGINAL
Qty: 20 G | Refills: 3 | Status: SHIPPED | OUTPATIENT
Start: 2024-09-11 | End: 2024-09-18

## 2024-09-11 RX ORDER — ALBUTEROL SULFATE 90 UG/1
2 AEROSOL, METERED RESPIRATORY (INHALATION) 4 TIMES DAILY PRN
Qty: 6.7 G | Refills: 5 | Status: SHIPPED | OUTPATIENT
Start: 2024-09-11

## 2024-09-11 RX ORDER — FLUTICASONE FUROATE AND VILANTEROL 100; 25 UG/1; UG/1
1 POWDER RESPIRATORY (INHALATION) DAILY
Qty: 180 EACH | Refills: 3 | Status: SHIPPED | OUTPATIENT
Start: 2024-09-11

## 2024-09-11 RX ORDER — BUSPIRONE HYDROCHLORIDE 5 MG/1
5 TABLET ORAL 2 TIMES DAILY
Qty: 60 TABLET | Refills: 5 | Status: SHIPPED | OUTPATIENT
Start: 2024-09-11 | End: 2025-03-10

## 2024-10-02 ENCOUNTER — TELEPHONE (OUTPATIENT)
Age: 79
End: 2024-10-02

## 2024-10-02 NOTE — TELEPHONE ENCOUNTER
Patient states that she was given a letter for her spinal stenosis. This letter was misplaced and I do not see it in her chart. Patient would like another copy. Please contact the patient.

## 2024-10-02 NOTE — TELEPHONE ENCOUNTER
V/m left for patient her letter dated for 6/4/24 is available in her chart & nurse needs to know if she would like to pick it up from the office or mailed to her.

## 2024-10-07 ENCOUNTER — OFFICE VISIT (OUTPATIENT)
Age: 79
End: 2024-10-07
Payer: MEDICARE

## 2024-10-07 VITALS
OXYGEN SATURATION: 96 % | SYSTOLIC BLOOD PRESSURE: 116 MMHG | HEART RATE: 70 BPM | BODY MASS INDEX: 27.03 KG/M2 | WEIGHT: 168.2 LBS | RESPIRATION RATE: 16 BRPM | HEIGHT: 66 IN | TEMPERATURE: 97.8 F | DIASTOLIC BLOOD PRESSURE: 70 MMHG

## 2024-10-07 DIAGNOSIS — T78.40XA ALLERGIC REACTION, INITIAL ENCOUNTER: Primary | ICD-10-CM

## 2024-10-07 DIAGNOSIS — I10 HYPERTENSION, UNSPECIFIED TYPE: ICD-10-CM

## 2024-10-07 PROCEDURE — G8399 PT W/DXA RESULTS DOCUMENT: HCPCS | Performed by: NURSE PRACTITIONER

## 2024-10-07 PROCEDURE — 1036F TOBACCO NON-USER: CPT | Performed by: NURSE PRACTITIONER

## 2024-10-07 PROCEDURE — 3074F SYST BP LT 130 MM HG: CPT | Performed by: NURSE PRACTITIONER

## 2024-10-07 PROCEDURE — G8427 DOCREV CUR MEDS BY ELIG CLIN: HCPCS | Performed by: NURSE PRACTITIONER

## 2024-10-07 PROCEDURE — G8482 FLU IMMUNIZE ORDER/ADMIN: HCPCS | Performed by: NURSE PRACTITIONER

## 2024-10-07 PROCEDURE — 1123F ACP DISCUSS/DSCN MKR DOCD: CPT | Performed by: NURSE PRACTITIONER

## 2024-10-07 PROCEDURE — 99214 OFFICE O/P EST MOD 30 MIN: CPT | Performed by: NURSE PRACTITIONER

## 2024-10-07 PROCEDURE — G8419 CALC BMI OUT NRM PARAM NOF/U: HCPCS | Performed by: NURSE PRACTITIONER

## 2024-10-07 PROCEDURE — 3078F DIAST BP <80 MM HG: CPT | Performed by: NURSE PRACTITIONER

## 2024-10-07 PROCEDURE — 1090F PRES/ABSN URINE INCON ASSESS: CPT | Performed by: NURSE PRACTITIONER

## 2024-10-07 RX ORDER — EPINEPHRINE 0.3 MG/.3ML
0.3 INJECTION SUBCUTANEOUS ONCE
Qty: 0.3 ML | Refills: 0 | Status: SHIPPED | OUTPATIENT
Start: 2024-10-07 | End: 2024-10-11

## 2024-10-07 RX ORDER — FAMOTIDINE 20 MG/1
20 TABLET, FILM COATED ORAL 2 TIMES DAILY
Qty: 180 TABLET | Refills: 1 | Status: SHIPPED | OUTPATIENT
Start: 2024-10-07

## 2024-10-07 RX ORDER — LORATADINE 10 MG/1
10 TABLET ORAL DAILY
Qty: 30 TABLET | Refills: 0 | Status: SHIPPED | OUTPATIENT
Start: 2024-10-07

## 2024-10-07 RX ORDER — BENZOCAINE/MENTHOL 6 MG-10 MG
LOZENGE MUCOUS MEMBRANE
Qty: 30 G | Refills: 1 | Status: SHIPPED | OUTPATIENT
Start: 2024-10-07 | End: 2024-10-14

## 2024-10-07 ASSESSMENT — ENCOUNTER SYMPTOMS
RHINORRHEA: 0
EYES NEGATIVE: 1
SHORTNESS OF BREATH: 0
BLOOD IN STOOL: 0
RESPIRATORY NEGATIVE: 1
CHEST TIGHTNESS: 0
GASTROINTESTINAL NEGATIVE: 1
ABDOMINAL PAIN: 0
VOMITING: 0
COUGH: 0
SINUS PAIN: 0
SINUS PRESSURE: 0
EYE PAIN: 0
NAUSEA: 0
BACK PAIN: 0
EYE REDNESS: 0
COLOR CHANGE: 0
DIARRHEA: 0
CONSTIPATION: 0

## 2024-10-07 NOTE — PROGRESS NOTES
Assessment and Plan     1. Allergic reaction, initial encounter: Benign physical, fully asymptomatic and no rash or skin erythema. Recommended to continue with Children benadryl at bedtime only, will add Loratadine during daytime as needed. Low potency topical corticosteroids recommended. Rx for Epipen given, mode of use and follow up post administration discussed. Return instructions given. Pt verbalized understanding.  -     loratadine (CLARITIN) 10 MG tablet; Take 1 tablet by mouth daily, Disp-30 tablet, R-0Normal  -     famotidine (PEPCID) 20 MG tablet; Take 1 tablet by mouth 2 times daily, Disp-180 tablet, R-1Normal  -     hydrocortisone 1 % cream; Apply topically 2 times daily., Disp-30 g, R-1, Normal  -     EPINEPHrine (EPIPEN 2-MOSES) 0.3 MG/0.3ML SOAJ injection; Inject 0.3 mLs into the muscle once for 1 dose Use as directed for allergic reaction, Disp-0.3 mL, R-0Normal  2. Hypertension, unspecified type: At goal. Continue with Metoprolol, Hydralazine and Losartan     BP Readings from Last 3 Encounters:   10/07/24 116/70   09/11/24 (!) 150/84   06/04/24 136/79     Benefits, risks, possible drug interactions, and side effects of all new medications were reviewed with the patient. Pt verbalized understanding.    An electronic signature was used to authenticate this note.  Annette Anderson, CHADD - CNP  10/7/2024      Follow-up and Dispositions    Return if symptoms worsen or fail to improve.          History of Present Illness   Chief Complaint     Martha Morrison is a 79 y.o. female here for had concerns including Rash.   Mrs. Morrison presents today with reports of rash to body since Saturday night after sitting outside under a tree. Positive for tiichiness.She started taking children Benadryl that night with improvement by next morning. Rash returns if not taking Benadryl.  Denies taking new medications, using new lotions/detergents/soaps. Pt is fully asymptomatic during office visit. PMH includes HTN,

## 2024-10-09 ENCOUNTER — TELEPHONE (OUTPATIENT)
Age: 79
End: 2024-10-09

## 2024-10-09 NOTE — TELEPHONE ENCOUNTER
Returned call to patient. Informed she only received a flu shot at her visit on 9/11 & it was given by the nurse. Informed the providers do not administer vaccines at appts. Discussed she received the RSV vaccine in Feb of this year & all she needs now is the updated covid vaccine. Patient voiced understanding.

## 2024-10-09 NOTE — TELEPHONE ENCOUNTER
Patient came in on 9/11/24, she said the Dr gave her a flu shot and then the nurse came in and gave her another shot but she can't remember what it was for    Call Martha [phone] (Mobile) or send [employer] message

## 2024-10-11 ENCOUNTER — OFFICE VISIT (OUTPATIENT)
Age: 79
End: 2024-10-11
Payer: MEDICARE

## 2024-10-11 VITALS
HEART RATE: 73 BPM | HEIGHT: 66 IN | RESPIRATION RATE: 16 BRPM | BODY MASS INDEX: 26.52 KG/M2 | TEMPERATURE: 97.7 F | OXYGEN SATURATION: 97 % | WEIGHT: 165 LBS | SYSTOLIC BLOOD PRESSURE: 138 MMHG | DIASTOLIC BLOOD PRESSURE: 80 MMHG

## 2024-10-11 DIAGNOSIS — R07.0 THROAT PAIN: Primary | ICD-10-CM

## 2024-10-11 PROCEDURE — 3075F SYST BP GE 130 - 139MM HG: CPT | Performed by: NURSE PRACTITIONER

## 2024-10-11 PROCEDURE — G8427 DOCREV CUR MEDS BY ELIG CLIN: HCPCS | Performed by: NURSE PRACTITIONER

## 2024-10-11 PROCEDURE — 99212 OFFICE O/P EST SF 10 MIN: CPT | Performed by: NURSE PRACTITIONER

## 2024-10-11 PROCEDURE — G8399 PT W/DXA RESULTS DOCUMENT: HCPCS | Performed by: NURSE PRACTITIONER

## 2024-10-11 PROCEDURE — 1123F ACP DISCUSS/DSCN MKR DOCD: CPT | Performed by: NURSE PRACTITIONER

## 2024-10-11 PROCEDURE — G8419 CALC BMI OUT NRM PARAM NOF/U: HCPCS | Performed by: NURSE PRACTITIONER

## 2024-10-11 PROCEDURE — 1090F PRES/ABSN URINE INCON ASSESS: CPT | Performed by: NURSE PRACTITIONER

## 2024-10-11 PROCEDURE — 1036F TOBACCO NON-USER: CPT | Performed by: NURSE PRACTITIONER

## 2024-10-11 PROCEDURE — 3079F DIAST BP 80-89 MM HG: CPT | Performed by: NURSE PRACTITIONER

## 2024-10-11 PROCEDURE — G8482 FLU IMMUNIZE ORDER/ADMIN: HCPCS | Performed by: NURSE PRACTITIONER

## 2024-10-11 ASSESSMENT — ENCOUNTER SYMPTOMS
DIARRHEA: 0
CONSTIPATION: 0
CHEST TIGHTNESS: 0
SHORTNESS OF BREATH: 0
VOMITING: 0
EYES NEGATIVE: 1
ABDOMINAL PAIN: 0
NAUSEA: 0
BLOOD IN STOOL: 0
RHINORRHEA: 0
SINUS PAIN: 0
BACK PAIN: 0
EYE PAIN: 0
COUGH: 0
SINUS PRESSURE: 0
RESPIRATORY NEGATIVE: 1
EYE REDNESS: 0
SORE THROAT: 1
GASTROINTESTINAL NEGATIVE: 1
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
## 2024-10-29 DIAGNOSIS — T78.40XA ALLERGIC REACTION, INITIAL ENCOUNTER: ICD-10-CM

## 2024-10-29 RX ORDER — LORATADINE 10 MG/1
10 TABLET ORAL DAILY
Qty: 90 TABLET | Refills: 1 | OUTPATIENT
Start: 2024-10-29

## 2024-12-28 ENCOUNTER — HOSPITAL ENCOUNTER (EMERGENCY)
Facility: HOSPITAL | Age: 79
Discharge: HOME | End: 2024-12-28
Attending: EMERGENCY MEDICINE
Payer: MEDICARE

## 2024-12-28 ENCOUNTER — APPOINTMENT (OUTPATIENT)
Facility: HOSPITAL | Age: 79
End: 2024-12-28
Payer: MEDICARE

## 2024-12-28 VITALS
OXYGEN SATURATION: 97 % | RESPIRATION RATE: 20 BRPM | TEMPERATURE: 97.2 F | WEIGHT: 165 LBS | DIASTOLIC BLOOD PRESSURE: 82 MMHG | SYSTOLIC BLOOD PRESSURE: 179 MMHG | HEART RATE: 63 BPM | BODY MASS INDEX: 26.52 KG/M2 | HEIGHT: 66 IN

## 2024-12-28 DIAGNOSIS — R07.9 CHEST PAIN, UNSPECIFIED TYPE: Primary | ICD-10-CM

## 2024-12-28 LAB
ALBUMIN SERPL-MCNC: 3.7 G/DL (ref 3.5–5)
ALBUMIN/GLOB SERPL: 0.9 (ref 1.1–2.2)
ALP SERPL-CCNC: 81 U/L (ref 45–117)
ALT SERPL-CCNC: 27 U/L (ref 12–78)
ANION GAP SERPL CALC-SCNC: 10 MMOL/L (ref 2–12)
AST SERPL-CCNC: 15 U/L (ref 15–37)
BASOPHILS # BLD: 0 K/UL (ref 0–0.1)
BASOPHILS NFR BLD: 0 % (ref 0–1)
BILIRUB SERPL-MCNC: 0.3 MG/DL (ref 0.2–1)
BUN SERPL-MCNC: 21 MG/DL (ref 6–20)
BUN/CREAT SERPL: 27 (ref 12–20)
CALCIUM SERPL-MCNC: 9 MG/DL (ref 8.5–10.1)
CHLORIDE SERPL-SCNC: 97 MMOL/L (ref 97–108)
CO2 SERPL-SCNC: 30 MMOL/L (ref 21–32)
COMMENT:: NORMAL
CREAT SERPL-MCNC: 0.79 MG/DL (ref 0.55–1.02)
DIFFERENTIAL METHOD BLD: NORMAL
EOSINOPHIL # BLD: 0.1 K/UL (ref 0–0.4)
EOSINOPHIL NFR BLD: 2 % (ref 0–7)
ERYTHROCYTE [DISTWIDTH] IN BLOOD BY AUTOMATED COUNT: 13.3 % (ref 11.5–14.5)
GLOBULIN SER CALC-MCNC: 3.9 G/DL (ref 2–4)
GLUCOSE SERPL-MCNC: 96 MG/DL (ref 65–100)
HCT VFR BLD AUTO: 39.4 % (ref 35–47)
HGB BLD-MCNC: 13 G/DL (ref 11.5–16)
IMM GRANULOCYTES # BLD AUTO: 0 K/UL (ref 0–0.04)
IMM GRANULOCYTES NFR BLD AUTO: 0 % (ref 0–0.5)
LYMPHOCYTES # BLD: 2.5 K/UL (ref 0.8–3.5)
LYMPHOCYTES NFR BLD: 36 % (ref 12–49)
MCH RBC QN AUTO: 30.7 PG (ref 26–34)
MCHC RBC AUTO-ENTMCNC: 33 G/DL (ref 30–36.5)
MCV RBC AUTO: 92.9 FL (ref 80–99)
MONOCYTES # BLD: 0.7 K/UL (ref 0–1)
MONOCYTES NFR BLD: 10 % (ref 5–13)
NEUTS SEG # BLD: 3.7 K/UL (ref 1.8–8)
NEUTS SEG NFR BLD: 52 % (ref 32–75)
NRBC # BLD: 0 K/UL (ref 0–0.01)
NRBC BLD-RTO: 0 PER 100 WBC
NT PRO BNP: 324 PG/ML (ref 0–450)
PLATELET # BLD AUTO: 280 K/UL (ref 150–400)
PMV BLD AUTO: 10.1 FL (ref 8.9–12.9)
POTASSIUM SERPL-SCNC: 4 MMOL/L (ref 3.5–5.1)
PROT SERPL-MCNC: 7.6 G/DL (ref 6.4–8.2)
RBC # BLD AUTO: 4.24 M/UL (ref 3.8–5.2)
SODIUM SERPL-SCNC: 137 MMOL/L (ref 136–145)
SPECIMEN HOLD: NORMAL
TROPONIN I SERPL HS-MCNC: 5 NG/L (ref 0–51)
TROPONIN I SERPL HS-MCNC: 8 NG/L (ref 0–51)
WBC # BLD AUTO: 7 K/UL (ref 3.6–11)

## 2024-12-28 PROCEDURE — 99285 EMERGENCY DEPT VISIT HI MDM: CPT

## 2024-12-28 PROCEDURE — 93005 ELECTROCARDIOGRAM TRACING: CPT | Performed by: EMERGENCY MEDICINE

## 2024-12-28 PROCEDURE — 71046 X-RAY EXAM CHEST 2 VIEWS: CPT

## 2024-12-28 PROCEDURE — 84484 ASSAY OF TROPONIN QUANT: CPT

## 2024-12-28 PROCEDURE — 36415 COLL VENOUS BLD VENIPUNCTURE: CPT

## 2024-12-28 PROCEDURE — 80053 COMPREHEN METABOLIC PANEL: CPT

## 2024-12-28 PROCEDURE — 85025 COMPLETE CBC W/AUTO DIFF WBC: CPT

## 2024-12-28 PROCEDURE — 83880 ASSAY OF NATRIURETIC PEPTIDE: CPT

## 2024-12-28 ASSESSMENT — ENCOUNTER SYMPTOMS
ABDOMINAL PAIN: 0
CHEST TIGHTNESS: 1
VOMITING: 0
SHORTNESS OF BREATH: 0
SORE THROAT: 0
BACK PAIN: 0
EYE PAIN: 0

## 2024-12-28 ASSESSMENT — PAIN DESCRIPTION - ONSET: ONSET: SUDDEN

## 2024-12-28 ASSESSMENT — PAIN DESCRIPTION - ORIENTATION: ORIENTATION: MID

## 2024-12-28 ASSESSMENT — PAIN - FUNCTIONAL ASSESSMENT
PAIN_FUNCTIONAL_ASSESSMENT: ACTIVITIES ARE NOT PREVENTED
PAIN_FUNCTIONAL_ASSESSMENT: 0-10
PAIN_FUNCTIONAL_ASSESSMENT: 0-10

## 2024-12-28 ASSESSMENT — PAIN DESCRIPTION - LOCATION: LOCATION: CHEST

## 2024-12-28 ASSESSMENT — PAIN SCALES - GENERAL
PAINLEVEL_OUTOF10: 0
PAINLEVEL_OUTOF10: 7

## 2024-12-28 ASSESSMENT — PAIN DESCRIPTION - FREQUENCY: FREQUENCY: CONTINUOUS

## 2024-12-28 ASSESSMENT — PAIN DESCRIPTION - DESCRIPTORS: DESCRIPTORS: ACHING

## 2024-12-28 ASSESSMENT — PAIN DESCRIPTION - PAIN TYPE: TYPE: ACUTE PAIN

## 2024-12-28 NOTE — ED TRIAGE NOTES
Pt ambulatory with cane to the ER, chest pain that started on christmas that went away, then this morning she didn't feel (dizzy and exhausted) well and then she began having chest pain again this afternoon.

## 2024-12-29 LAB
EKG ATRIAL RATE: 64 BPM
EKG DIAGNOSIS: NORMAL
EKG P AXIS: 65 DEGREES
EKG P-R INTERVAL: 216 MS
EKG Q-T INTERVAL: 398 MS
EKG QRS DURATION: 92 MS
EKG QTC CALCULATION (BAZETT): 410 MS
EKG R AXIS: -24 DEGREES
EKG T AXIS: 28 DEGREES
EKG VENTRICULAR RATE: 64 BPM

## 2024-12-29 PROCEDURE — 93010 ELECTROCARDIOGRAM REPORT: CPT | Performed by: INTERNAL MEDICINE

## 2024-12-29 NOTE — DISCHARGE INSTRUCTIONS
Please call your cardiologist on Monday to arrange close follow-up.  Return to the ER if you experience any worsening symptoms.

## 2024-12-29 NOTE — ED PROVIDER NOTES
Vitals:    12/28/24 1649 12/28/24 1700 12/28/24 1726 12/28/24 1824   BP: (!) 189/91 (!) 171/75 (!) 170/81    Pulse: 67 63 62 62   Resp: 18 16 16 19   Temp: 97.2 °F (36.2 °C)      SpO2: 100% 97% 97% 97%   Weight:       Height:             Medical Decision Making  Amount and/or Complexity of Data Reviewed  Labs: ordered.  Radiology: ordered.  ECG/medicine tests: ordered and independent interpretation performed.     Details: EKG at 1646, sinus rhythm with first-degree AV block, ventricular rate of 64, no STEMI, no obvious ischemic changes, occasional PVCs            REASSESSMENT     ED Course as of 12/28/24 1925   Sat Dec 28, 2024   1817 Findings: Cardiomediastinal silhouette is within normal limits. Lungs are clear  bilaterally. Pleural spaces are normal. Osseous structures are diffusely  demineralized. There is a levoconvex thoracolumbar scoliosis.        IMPRESSION:  No acute cardiopulmonary disease.   [BN]   1905 I had a lengthy discussion with the patient about her results.  Initial troponin is negative.  I offered to admit her into the hospital.  She would like to have a repeat troponin done and if this test is negative she would like to go home.  States that she has not been having any chest pain in the last couple hours.  She does have a primary cardiologist that she can follow-up with. [BN]   1924 Repeat troponin is negative.  Had another risk with patient, she affirms that she would still like to go home.  She is symptom-free and will follow-up with her cardiologist on Monday. [BN]      ED Course User Index  [BN] Milton Griffin MD         CONSULTS:  None    PROCEDURES:     Procedures        (Please note that portions of this note were completed with a voice recognition program.  Efforts were made to edit the dictations but occasionally words are mis-transcribed.)    Milton Griffin MD (electronically signed)  Emergency Attending Physician              Milton Griffin MD  12/28/24 1925

## 2025-01-09 ENCOUNTER — OFFICE VISIT (OUTPATIENT)
Facility: CLINIC | Age: 80
End: 2025-01-09
Payer: MEDICARE

## 2025-01-09 VITALS
SYSTOLIC BLOOD PRESSURE: 142 MMHG | OXYGEN SATURATION: 96 % | TEMPERATURE: 98.4 F | DIASTOLIC BLOOD PRESSURE: 82 MMHG | HEART RATE: 62 BPM | HEIGHT: 66 IN | RESPIRATION RATE: 16 BRPM | WEIGHT: 167 LBS | BODY MASS INDEX: 26.84 KG/M2

## 2025-01-09 DIAGNOSIS — I10 HTN, GOAL BELOW 130/80: Primary | ICD-10-CM

## 2025-01-09 DIAGNOSIS — I48.0 PAROXYSMAL ATRIAL FIBRILLATION (HCC): ICD-10-CM

## 2025-01-09 DIAGNOSIS — R07.9 CHEST PAIN AT REST: ICD-10-CM

## 2025-01-09 PROCEDURE — 1090F PRES/ABSN URINE INCON ASSESS: CPT | Performed by: INTERNAL MEDICINE

## 2025-01-09 PROCEDURE — 3077F SYST BP >= 140 MM HG: CPT | Performed by: INTERNAL MEDICINE

## 2025-01-09 PROCEDURE — 3079F DIAST BP 80-89 MM HG: CPT | Performed by: INTERNAL MEDICINE

## 2025-01-09 PROCEDURE — 1036F TOBACCO NON-USER: CPT | Performed by: INTERNAL MEDICINE

## 2025-01-09 PROCEDURE — 1160F RVW MEDS BY RX/DR IN RCRD: CPT | Performed by: INTERNAL MEDICINE

## 2025-01-09 PROCEDURE — 1126F AMNT PAIN NOTED NONE PRSNT: CPT | Performed by: INTERNAL MEDICINE

## 2025-01-09 PROCEDURE — G8419 CALC BMI OUT NRM PARAM NOF/U: HCPCS | Performed by: INTERNAL MEDICINE

## 2025-01-09 PROCEDURE — 1159F MED LIST DOCD IN RCRD: CPT | Performed by: INTERNAL MEDICINE

## 2025-01-09 PROCEDURE — 1123F ACP DISCUSS/DSCN MKR DOCD: CPT | Performed by: INTERNAL MEDICINE

## 2025-01-09 PROCEDURE — G8427 DOCREV CUR MEDS BY ELIG CLIN: HCPCS | Performed by: INTERNAL MEDICINE

## 2025-01-09 PROCEDURE — G8399 PT W/DXA RESULTS DOCUMENT: HCPCS | Performed by: INTERNAL MEDICINE

## 2025-01-09 PROCEDURE — 99214 OFFICE O/P EST MOD 30 MIN: CPT | Performed by: INTERNAL MEDICINE

## 2025-01-09 RX ORDER — FAMOTIDINE 20 MG/1
20 TABLET, FILM COATED ORAL DAILY
Qty: 90 TABLET | Refills: 3 | Status: SHIPPED | OUTPATIENT
Start: 2025-01-09

## 2025-01-09 SDOH — ECONOMIC STABILITY: FOOD INSECURITY: WITHIN THE PAST 12 MONTHS, YOU WORRIED THAT YOUR FOOD WOULD RUN OUT BEFORE YOU GOT MONEY TO BUY MORE.: NEVER TRUE

## 2025-01-09 SDOH — ECONOMIC STABILITY: FOOD INSECURITY: WITHIN THE PAST 12 MONTHS, THE FOOD YOU BOUGHT JUST DIDN'T LAST AND YOU DIDN'T HAVE MONEY TO GET MORE.: NEVER TRUE

## 2025-01-09 ASSESSMENT — PATIENT HEALTH QUESTIONNAIRE - PHQ9
SUM OF ALL RESPONSES TO PHQ QUESTIONS 1-9: 0
SUM OF ALL RESPONSES TO PHQ QUESTIONS 1-9: 0
2. FEELING DOWN, DEPRESSED OR HOPELESS: NOT AT ALL
SUM OF ALL RESPONSES TO PHQ QUESTIONS 1-9: 0
1. LITTLE INTEREST OR PLEASURE IN DOING THINGS: NOT AT ALL
SUM OF ALL RESPONSES TO PHQ QUESTIONS 1-9: 0
SUM OF ALL RESPONSES TO PHQ9 QUESTIONS 1 & 2: 0

## 2025-01-09 NOTE — PROGRESS NOTES
Martha Morrison (:  1945) is a 79 y.o. female,Established patient, here for evaluation of the following chief complaint(s):  Hypertension (4 month follow up)         Assessment & Plan  HTN, goal below 130/80   Cont meds and sees dr garcía in early feb  Some dizzy spells-doubt from htn  Reports trouble withi vision-will see ophthalmology-recently completed normal cardiac testing         Paroxysmal atrial fibrillation (HCC)   Cont eliquis  Recent extended monitor did not show recent rhythm issues         Chest pain at rest   Had er visit-labs ok  Had nuclear testing with dr garcía and reports all normal  ? Gi source-resume pepcid    Orders:  •  famotidine (PEPCID) 20 MG tablet; Take 1 tablet by mouth daily      No follow-ups on file.       Subjective   Hypertension  Since our last visit she did have an ER visit end of December for chest pain.  Troponin and chemistries and EKG were fine.  She subsequently followed up with Dr. Tucker Chapa from cardiology and reports that nuclear testing was normal.  Also has had a recent extended monitor to look for recurrent A-fib and reports that this is normal.  Currently denies significant chest pain.  No heartburn symptoms.  Sometimes feels discomfort if she bends forward.  The pain is nonradiating.  Discussed use of Pepcid to control any silent acid and she has used this before.    Hypertension on losartan hydralazine and metoprolol.  Blood pressure has been labile.  Continues with cardiology.  Has some dizzy spells but they are also coincident with feeling that her vision is not as good particularly for distance.  Will see ophthalmology soon.  Not having fevers chills or focal neurologic symptoms.    Anxiety tolerates the BuSpar 5 mg twice daily denies any sensation of internal restlessness    Review of Systems       Objective   Physical Exam  Constitutional:       Appearance: Normal appearance.   HENT:      Head: Normocephalic and atraumatic.      Right Ear: Tympanic

## 2025-02-18 ENCOUNTER — TELEPHONE (OUTPATIENT)
Facility: CLINIC | Age: 80
End: 2025-02-18

## 2025-02-18 DIAGNOSIS — J45.40 MODERATE PERSISTENT ASTHMA WITHOUT COMPLICATION: ICD-10-CM

## 2025-02-18 RX ORDER — FLUTICASONE FUROATE AND VILANTEROL 100; 25 UG/1; UG/1
1 POWDER RESPIRATORY (INHALATION) DAILY
Qty: 180 EACH | Refills: 3 | Status: SHIPPED | OUTPATIENT
Start: 2025-02-18 | End: 2025-02-19 | Stop reason: SDUPTHER

## 2025-02-18 RX ORDER — ALBUTEROL SULFATE 90 UG/1
2 INHALANT RESPIRATORY (INHALATION) 4 TIMES DAILY PRN
Qty: 6.7 G | Refills: 5 | Status: SHIPPED | OUTPATIENT
Start: 2025-02-18

## 2025-02-18 NOTE — TELEPHONE ENCOUNTER
Patient calling to update Pharmacy - states she needs these two medications sent to mail order:    albuterol sulfate HFA (PROVENTIL;VENTOLIN;PROAIR) 108 (90 Base) MCG/ACT inhaler   fluticasone furoate-vilanterol (BREO ELLIPTA) 100-25 MCG/ACT inhaler     [facility] - [place] [address] - [address] - P [phone] - F [phone]

## 2025-02-19 DIAGNOSIS — J45.40 MODERATE PERSISTENT ASTHMA WITHOUT COMPLICATION: ICD-10-CM

## 2025-02-19 RX ORDER — FLUTICASONE FUROATE AND VILANTEROL 100; 25 UG/1; UG/1
1 POWDER RESPIRATORY (INHALATION) DAILY
Qty: 60 EACH | Refills: 0 | Status: SHIPPED | OUTPATIENT
Start: 2025-02-19

## 2025-02-24 ENCOUNTER — TELEPHONE (OUTPATIENT)
Facility: CLINIC | Age: 80
End: 2025-02-24

## 2025-02-24 NOTE — TELEPHONE ENCOUNTER
Ashley called from [facility] and she will like to ask clarifying question about the patient medication.     albuterol sulfate HFA (PROVENTIL;VENTOLIN;PROAIR) 108 (90 Base) MCG/ACT inhaler     Phone: [phone] option 2   Reference number [id number]

## 2025-02-25 NOTE — TELEPHONE ENCOUNTER
[facility]ed [facility] & provided verbal approval to change the patient's albuterol rx to a 90-day supply.

## 2025-03-24 ENCOUNTER — OFFICE VISIT (OUTPATIENT)
Facility: CLINIC | Age: 80
End: 2025-03-24
Payer: MEDICARE

## 2025-03-24 VITALS
SYSTOLIC BLOOD PRESSURE: 118 MMHG | HEIGHT: 66 IN | WEIGHT: 168.8 LBS | HEART RATE: 70 BPM | TEMPERATURE: 97.7 F | OXYGEN SATURATION: 97 % | DIASTOLIC BLOOD PRESSURE: 75 MMHG | RESPIRATION RATE: 14 BRPM | BODY MASS INDEX: 27.13 KG/M2

## 2025-03-24 DIAGNOSIS — J45.40 MODERATE PERSISTENT ASTHMA WITHOUT COMPLICATION: ICD-10-CM

## 2025-03-24 DIAGNOSIS — N64.4 BREAST PAIN, RIGHT: Primary | ICD-10-CM

## 2025-03-24 PROCEDURE — 99213 OFFICE O/P EST LOW 20 MIN: CPT | Performed by: NURSE PRACTITIONER

## 2025-03-24 PROCEDURE — G8427 DOCREV CUR MEDS BY ELIG CLIN: HCPCS | Performed by: NURSE PRACTITIONER

## 2025-03-24 PROCEDURE — 3074F SYST BP LT 130 MM HG: CPT | Performed by: NURSE PRACTITIONER

## 2025-03-24 PROCEDURE — 3078F DIAST BP <80 MM HG: CPT | Performed by: NURSE PRACTITIONER

## 2025-03-24 PROCEDURE — 1036F TOBACCO NON-USER: CPT | Performed by: NURSE PRACTITIONER

## 2025-03-24 PROCEDURE — 1160F RVW MEDS BY RX/DR IN RCRD: CPT | Performed by: NURSE PRACTITIONER

## 2025-03-24 PROCEDURE — 1159F MED LIST DOCD IN RCRD: CPT | Performed by: NURSE PRACTITIONER

## 2025-03-24 PROCEDURE — G8399 PT W/DXA RESULTS DOCUMENT: HCPCS | Performed by: NURSE PRACTITIONER

## 2025-03-24 PROCEDURE — G8419 CALC BMI OUT NRM PARAM NOF/U: HCPCS | Performed by: NURSE PRACTITIONER

## 2025-03-24 PROCEDURE — 1090F PRES/ABSN URINE INCON ASSESS: CPT | Performed by: NURSE PRACTITIONER

## 2025-03-24 PROCEDURE — 1123F ACP DISCUSS/DSCN MKR DOCD: CPT | Performed by: NURSE PRACTITIONER

## 2025-03-24 RX ORDER — FLUTICASONE FUROATE AND VILANTEROL 100; 25 UG/1; UG/1
1 POWDER RESPIRATORY (INHALATION) DAILY
Qty: 180 EACH | Refills: 1 | Status: SHIPPED | OUTPATIENT
Start: 2025-03-24

## 2025-03-24 ASSESSMENT — PATIENT HEALTH QUESTIONNAIRE - PHQ9
SUM OF ALL RESPONSES TO PHQ QUESTIONS 1-9: 0
2. FEELING DOWN, DEPRESSED OR HOPELESS: NOT AT ALL
SUM OF ALL RESPONSES TO PHQ QUESTIONS 1-9: 0
1. LITTLE INTEREST OR PLEASURE IN DOING THINGS: NOT AT ALL

## 2025-03-24 NOTE — PROGRESS NOTES
Assessment and Plan     1. Breast pain, right: Tylenol for pain as needed, can take up to 3 grams daily. Breast images ordered.   -     [facility] DEBI DIGITAL DIAGNOSTIC UNILATERAL RIGHT; Future  -     US BREAST COMPLETE RIGHT; Future       Benefits, risks, possible drug interactions, and side effects of all new medications were reviewed with the patient. Pt verbalized understanding.    An electronic signature was used to authenticate this note.  Annette Urenarusty, APRN - CNP  3/25/2025      Follow-up and Dispositions    Return if symptoms worsen or fail to improve.        History of Present Illness   Chief Complaint     Martha Morrison is a 79 y.o. female here for had concerns including Fall (Fell onto bathroom counter, landed on breast, friday).   Mrs. Morrison presents today with reports of R  breast pain, edema and bruising for the past 3 days. Symptoms started after a fall. Tripped and hit R breast on bathroom counter. Reports her R breast hurts. History of mastatits of R breast 10 years ago. Had screening mammogram last year. Denies breast lumps or nipple discharge.    Review of Systems  Review of Systems   Constitutional: Negative.  Negative for chills, fatigue, fever and unexpected weight change.   HENT: Negative.  Negative for congestion, rhinorrhea, sinus pressure and sinus pain.    Eyes: Negative.  Negative for pain, redness and visual disturbance.   Respiratory: Negative.  Negative for cough, chest tightness and shortness of breath.    Cardiovascular: Negative.  Negative for chest pain and palpitations.   Gastrointestinal: Negative.  Negative for abdominal pain, blood in stool, constipation, diarrhea, nausea and vomiting.   Endocrine: Negative.  Negative for polydipsia, polyphagia and polyuria.   Genitourinary: Negative.  Negative for difficulty urinating, dysuria, frequency and urgency.   Musculoskeletal: Negative.  Negative for back pain.   Skin:  Positive for color change (R breast).   Neurological:

## 2025-03-25 ASSESSMENT — ENCOUNTER SYMPTOMS
RESPIRATORY NEGATIVE: 1
SINUS PAIN: 0
SINUS PRESSURE: 0
BACK PAIN: 0
COUGH: 0
EYE REDNESS: 0
COLOR CHANGE: 1
VOMITING: 0
EYE PAIN: 0
DIARRHEA: 0
CHEST TIGHTNESS: 0
NAUSEA: 0
ABDOMINAL PAIN: 0
SHORTNESS OF BREATH: 0
EYES NEGATIVE: 1
CONSTIPATION: 0
BLOOD IN STOOL: 0
RHINORRHEA: 0
GASTROINTESTINAL NEGATIVE: 1

## 2025-04-10 ENCOUNTER — OFFICE VISIT (OUTPATIENT)
Facility: CLINIC | Age: 80
End: 2025-04-10
Payer: MEDICARE

## 2025-04-10 VITALS
RESPIRATION RATE: 16 BRPM | WEIGHT: 168 LBS | HEART RATE: 61 BPM | HEIGHT: 66 IN | SYSTOLIC BLOOD PRESSURE: 155 MMHG | DIASTOLIC BLOOD PRESSURE: 91 MMHG | BODY MASS INDEX: 27 KG/M2 | TEMPERATURE: 97.6 F | OXYGEN SATURATION: 95 %

## 2025-04-10 DIAGNOSIS — J45.40 MODERATE PERSISTENT ASTHMA WITHOUT COMPLICATION: ICD-10-CM

## 2025-04-10 DIAGNOSIS — I10 HTN, GOAL BELOW 130/80: Primary | ICD-10-CM

## 2025-04-10 DIAGNOSIS — I48.0 PAROXYSMAL ATRIAL FIBRILLATION (HCC): ICD-10-CM

## 2025-04-10 DIAGNOSIS — N64.4 BREAST PAIN, RIGHT: ICD-10-CM

## 2025-04-10 PROBLEM — J47.9 BRONCHIECTASIS WITHOUT COMPLICATION (HCC): Status: RESOLVED | Noted: 2020-01-24 | Resolved: 2025-04-10

## 2025-04-10 PROCEDURE — 3077F SYST BP >= 140 MM HG: CPT | Performed by: INTERNAL MEDICINE

## 2025-04-10 PROCEDURE — 1126F AMNT PAIN NOTED NONE PRSNT: CPT | Performed by: INTERNAL MEDICINE

## 2025-04-10 PROCEDURE — 3080F DIAST BP >= 90 MM HG: CPT | Performed by: INTERNAL MEDICINE

## 2025-04-10 PROCEDURE — 1160F RVW MEDS BY RX/DR IN RCRD: CPT | Performed by: INTERNAL MEDICINE

## 2025-04-10 PROCEDURE — 99214 OFFICE O/P EST MOD 30 MIN: CPT | Performed by: INTERNAL MEDICINE

## 2025-04-10 PROCEDURE — 1123F ACP DISCUSS/DSCN MKR DOCD: CPT | Performed by: INTERNAL MEDICINE

## 2025-04-10 PROCEDURE — 1090F PRES/ABSN URINE INCON ASSESS: CPT | Performed by: INTERNAL MEDICINE

## 2025-04-10 PROCEDURE — G8399 PT W/DXA RESULTS DOCUMENT: HCPCS | Performed by: INTERNAL MEDICINE

## 2025-04-10 PROCEDURE — G8427 DOCREV CUR MEDS BY ELIG CLIN: HCPCS | Performed by: INTERNAL MEDICINE

## 2025-04-10 PROCEDURE — 1036F TOBACCO NON-USER: CPT | Performed by: INTERNAL MEDICINE

## 2025-04-10 PROCEDURE — G8419 CALC BMI OUT NRM PARAM NOF/U: HCPCS | Performed by: INTERNAL MEDICINE

## 2025-04-10 PROCEDURE — 1159F MED LIST DOCD IN RCRD: CPT | Performed by: INTERNAL MEDICINE

## 2025-04-10 RX ORDER — HYDRALAZINE HYDROCHLORIDE 25 MG/1
50 TABLET, FILM COATED ORAL 2 TIMES DAILY
Qty: 360 TABLET | Refills: 2 | Status: SHIPPED | OUTPATIENT
Start: 2025-04-10

## 2025-04-10 RX ORDER — FLUTICASONE FUROATE AND VILANTEROL 100; 25 UG/1; UG/1
1 POWDER RESPIRATORY (INHALATION) DAILY
Qty: 180 EACH | Refills: 3 | Status: SHIPPED | OUTPATIENT
Start: 2025-04-10

## 2025-04-10 NOTE — ASSESSMENT & PLAN NOTE
Overall stable.  Requests refill on Breo    Orders:    fluticasone furoate-vilanterol (BREO ELLIPTA) 100-25 MCG/ACT inhaler; Inhale 1 puff into the lungs daily

## 2025-04-10 NOTE — PROGRESS NOTES
Martha Morrison (:  1945) is a 79 y.o. female,Established patient, here for evaluation of the following chief complaint(s):  Hypertension (3 month follow up)         Assessment & Plan  Moderate persistent asthma without complication  Overall stable.  Requests refill on Breo    Orders:  •  fluticasone furoate-vilanterol (BREO ELLIPTA) 100-25 MCG/ACT inhaler; Inhale 1 puff into the lungs daily    HTN, goal below 130/80  Diastolic blood pressures running elevated.  This week she increased hydralazine to 50 mg twice daily.  Discussed continue this increased dose for a month to assess efficacy.  Continue Toprol 50 mg and losartan 100 mg         Breast pain, right  Had what sounds like a hematoma in right breast.  Was seen in this office mammogram ordered.  She did not go for the mammogram but saw her gynecologist who felt that it was a hematoma and he will check her again in several weeks.  If she continues to have spontaneous bleeding may need to decrease dosing of Eliquis         Paroxysmal atrial fibrillation (HCC)  On Eliquis for stroke prevention    Orders:  •  apixaban (ELIQUIS) 5 MG TABS tablet; Take 1 tablet by mouth 2 times daily      No follow-ups on file.       Subjective   HPI  Seen for med check.  Notes her blood pressure at home has been running elevated with diastolics in the 90s so 4 days ago she started taking a higher dose of hydralazine 50 mg twice daily.  So far it has not made a huge difference in her pressures.  She is not having headaches chest pain or dizzy spells.  She did in March have what sounds like a spontaneous hematoma in right breast.  No trauma.  The hematoma is resolving and she has seen gynecology who is now following it.  She has not had other bleeding.  Does use Eliquis chronically for stroke prevention because of paroxysmal A-fib.  If any recurrent bleeding other than the breast might need to decrease dosing of Eliquis.  Breathing stable  Review of Systems

## 2025-04-10 NOTE — ASSESSMENT & PLAN NOTE
On Eliquis for stroke prevention    Orders:    apixaban (ELIQUIS) 5 MG TABS tablet; Take 1 tablet by mouth 2 times daily

## 2025-04-17 ENCOUNTER — TELEPHONE (OUTPATIENT)
Facility: CLINIC | Age: 80
End: 2025-04-17

## 2025-04-17 DIAGNOSIS — I48.0 PAROXYSMAL ATRIAL FIBRILLATION (HCC): ICD-10-CM

## 2025-04-17 NOTE — TELEPHONE ENCOUNTER
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  Clearly recognizes parents.  Moving well with no deficits apparent but not ambulatory.    Past Medical History  History reviewed. No pertinent past medical history.    Past Surgical History:   Procedure Laterality Date    Eeg cont rec phys or qual prof greater 12hrs up to 26hrs w veeg  2024       Immunizations    There is no immunization history on file for this patient.    Allergies  ALLERGIES:  No Known Allergies    Home Medications  Current Facility-Administered Medications   Medication    hydrOXYzine (ATARAX) 10 MG/5ML oral syrup 12.2 mg    diphenhydrAMINE (BENADRYL) 12.5 MG/5ML liquid 24.25 mg    niCARdipine (CARDENE) 125 mg/250 mL in sodium chloride 0.9 % infusion    vancomycin (VANCOCIN) 450 mg in sodium chloride 0.9 % 100 mL IVPB    lactulose (CHRONULAC) 10 GM/15ML solution 10 g    potassium phosphate 3 mMol/mL oral liquid 7.2 mmol    melatonin tablet 3 mg    polyethylene glycol (MIRALAX) packet 17 g    QUEtiapine (SEROQUEL) 10 MG/ML (compounded) oral suspension 12 mg    isradipine (DYNACIRC) (compounded) oral suspension 1.2 mg    levOCARNitine (CARNITOR) 1 GM/10ML solution 390 mg    docusate sodium-sennosides (SENOKOT S) 50-8.6 MG 1 tablet    gabapentin (NEURONTIN) 250 MG/5ML solution 240 mg    famotidine (PEPCID) oral suspension 12 mg    levETIRAcetam ORAL (KepPRA) 100 MG/ML oral solution 240 mg    petrolatum (white)-mineral oil (LUBRIFRESH PM) ophthalmic ointment    ursodiol (ACTIGALL)  The patient is requesting a call back to discuss the mail order services advising her to call the office an ask to for a short 14 day supply of apixaban (ELIQUIS) 5 MG TABS tablet  to be sent to local pharmacy because mail order has not arrived. She only takes the brand medication. [phone]     [facility]pharm[facility] #[id number] - [place], [place] - [address] - P [phone] - F [phone]    (compounded) oral suspension 240 mg    dextrose 5 % / lactated ringers infusion    dextrose 5% flush pediatric syringe 3 mL    cefepime (MAXIPIME) 40 mg/mL in NaCl 0.9% IVPB syringe 1,200 mg 30 mL    acetaminophen (OFIRMEV) IV solution 360 mg    LORazepam (ATIVAN) injection 2 mg    acyclovir (ZOVIRAX) 240 mg in dextrose 5% pediatric IVPB syringe    VANCOMYCIN - PHARMACIST MONITORED Misc    fosphenytoin PE (CEREBYX) 480 mg PE in NaCl 0.9% /peds IV syringe    Ultimate omega gummy chew    sodium chloride 0.9 % injection 25 mL    EPINEPHrine (ADRENALIN) injection 0.3 mg    albuterol (VENTOLIN) nebulizer 5 mg    sodium chloride (NORMAL SALINE) 0.9 % bolus 484 mL    sodium chloride 0.9% infusion    methylPREDNISolone (SOLU-Medrol) injection 48.4 mg    simethicone (MYLICON) 40 MG/0.6ML drops 40 mg    lidocaine (ANECREAM/LMX) 4 % cream    ondansetron (ZOFRAN) tablet 4 mg    sodium chloride 0.9 % injection 25 mL    heparin (porcine) 10 UNIT/ML lock flush 30 Units    sodium chloride 0.9 % injection 0.6-4.6 mL    sodium chloride 0.9 % flush bag 25 mL    sodium chloride 0.9 % injection 0.5-10 mL       Family History  Family History   Problem Relation Age of Onset    Hyperlipidemia Father     Cancer, CNS Maternal Grandmother     Thyroid Maternal Grandmother     Rheumatoid Arthritis Maternal Grandfather     Myocardial Infarction Paternal Grandmother         Aged 51, unclear exact etiology but thought to be MI    Diabetes Paternal Grandfather     Cancer, Throat Maternal Great-Grandfather     Cancer, Breast Maternal Great-Grandmother     Diabetes Other     Thyroid Other             Review of Systems  Review of Systems   All other systems reviewed and are negative.      Vitals  Visit Vitals  BP (!) 110/76   Pulse (!) 160   Temp 99 °F (37.2 °C) (Temporal)   Resp 23   Ht 127.5 cm   Wt (!) 25.4 kg   SpO2 100%   BMI 15.75 kg/m²       Weight    24 1115 24 0822 24 0000 24 1200   Weight: 23.9 kg 23.9 kg (!)  24.7 kg (!) 25.4 kg     Ht Readings from Last 1 Encounters:   04/26/24 127.5 cm (>99%, Z= 3.07)*     * Growth percentiles are based on CDC (Girls, 2-20 Years) data.        Physical Exam  Physical Exam  Vitals reviewed.   Constitutional:       Comments: sedated   HENT:      Nose: No congestion.      Mouth/Throat:      Comments: Secretions ntoed  Cardiovascular:      Rate and Rhythm: Normal rate.   Pulmonary:      Effort: No respiratory distress.      Comments: extubated  Abdominal:      General: Abdomen is flat.      Palpations: Abdomen is soft.   Musculoskeletal:         General: No swelling or signs of injury.   Skin:     Coloration: Skin is not cyanotic or pale.   Neurological:      Comments: sedated          Labs:   Labs reviewed.    Imaging:   Not applicable         Assessment  Acute Lymphoblastic Leukemia, in remission  - due for consolidation therapy but delayed due to low counts and complications  Seizures and mental status changes most consistent with posterior reversible encephalopathy syndrome  Hypertension  Fever (possible sepsis, possible meningitis)  Hepatic dysfunction most likely associated with calaspargase chemotherapy  - hypertriglyceridemia  - hyperbilirubinemia  Feeding problems due to neurologic complications  - on NG feeds  Abdominal pain (no etiology identified)      Plan  Reviewed clinical status.  Reviewed toxicity.  Reviewed recent chemotherapy.  Reviewed lab results.  Not appropriate to start consolldation phase.    Plan to repeat MRI scan today or tomorrow.  Plan to repeat lumbar puncture for diagnostic studies as recommended by Infectious Disease and Neurology services.    Blood pressure control as per nephrology.  Keep Mg > 1.8  Monitor renal status closely.    Fish oil/omega-3 supplementation for hypertriglyceridemia.  Ursodiol and carnitine for hyperbilirubinemia/hepatic dysfunction.  Lactulose to maintain soft stool.  Continue NG feeds.    Continue antibiotics as recommended  (cefepime, vancomycin, acyclovir) until sepsis/meningitis ruled out.    Monitor labs:  cbc, BMP, Mg, LFT, TG,     Keep hgb > 7 and platelets > 30 (due to PRES and risk for bleeding).  Could consider filgrastim (G-CSF) 5 mcg/kg/day (IV route okay) for absolute neutrophil count < 1000.    Monitor neurologic status closely.    Appreciate critical care primary management.  Appreciated consultation input of neurology, infectious disease, nephrology, gastroenterology, palliative care (PACT team) and neurosurgery services.    Discussed with family, critical care staff, and consulting physicians.      William Goodell, [place] [address]  Office: [phone] (internal: )  Fax: [phone]    6/5/2024

## 2025-04-17 NOTE — TELEPHONE ENCOUNTER
2 week supply of eliquis sent to the patient's local pharmacy as requested. Patient notified & voiced understanding.

## 2025-05-23 ENCOUNTER — OFFICE VISIT (OUTPATIENT)
Facility: CLINIC | Age: 80
End: 2025-05-23
Payer: MEDICARE

## 2025-05-23 VITALS
TEMPERATURE: 97.6 F | HEIGHT: 66 IN | BODY MASS INDEX: 27 KG/M2 | OXYGEN SATURATION: 95 % | WEIGHT: 168 LBS | SYSTOLIC BLOOD PRESSURE: 142 MMHG | DIASTOLIC BLOOD PRESSURE: 76 MMHG | RESPIRATION RATE: 16 BRPM | HEART RATE: 71 BPM

## 2025-05-23 DIAGNOSIS — N39.0 RECURRENT UTI: Primary | ICD-10-CM

## 2025-05-23 DIAGNOSIS — J45.40 MODERATE PERSISTENT ASTHMA WITHOUT COMPLICATION: ICD-10-CM

## 2025-05-23 DIAGNOSIS — R30.0 BURNING WITH URINATION: ICD-10-CM

## 2025-05-23 DIAGNOSIS — I48.0 PAROXYSMAL ATRIAL FIBRILLATION (HCC): ICD-10-CM

## 2025-05-23 LAB
BILIRUBIN, URINE, POC: NEGATIVE
BLOOD URINE, POC: NEGATIVE
GLUCOSE URINE, POC: NEGATIVE
KETONES, URINE, POC: NEGATIVE
LEUKOCYTE ESTERASE, URINE, POC: NORMAL
NITRITE, URINE, POC: NEGATIVE
PH, URINE, POC: 6 (ref 4.6–8)
PROTEIN,URINE, POC: NORMAL
SPECIFIC GRAVITY, URINE, POC: 1.02 (ref 1–1.03)
URINALYSIS CLARITY, POC: NORMAL
URINALYSIS COLOR, POC: YELLOW
UROBILINOGEN, POC: NORMAL MG/DL

## 2025-05-23 PROCEDURE — 99214 OFFICE O/P EST MOD 30 MIN: CPT | Performed by: INTERNAL MEDICINE

## 2025-05-23 PROCEDURE — G8419 CALC BMI OUT NRM PARAM NOF/U: HCPCS | Performed by: INTERNAL MEDICINE

## 2025-05-23 PROCEDURE — 1159F MED LIST DOCD IN RCRD: CPT | Performed by: INTERNAL MEDICINE

## 2025-05-23 PROCEDURE — 3077F SYST BP >= 140 MM HG: CPT | Performed by: INTERNAL MEDICINE

## 2025-05-23 PROCEDURE — 81001 URINALYSIS AUTO W/SCOPE: CPT | Performed by: INTERNAL MEDICINE

## 2025-05-23 PROCEDURE — G8399 PT W/DXA RESULTS DOCUMENT: HCPCS | Performed by: INTERNAL MEDICINE

## 2025-05-23 PROCEDURE — 1123F ACP DISCUSS/DSCN MKR DOCD: CPT | Performed by: INTERNAL MEDICINE

## 2025-05-23 PROCEDURE — 1126F AMNT PAIN NOTED NONE PRSNT: CPT | Performed by: INTERNAL MEDICINE

## 2025-05-23 PROCEDURE — 1090F PRES/ABSN URINE INCON ASSESS: CPT | Performed by: INTERNAL MEDICINE

## 2025-05-23 PROCEDURE — 1036F TOBACCO NON-USER: CPT | Performed by: INTERNAL MEDICINE

## 2025-05-23 PROCEDURE — G8427 DOCREV CUR MEDS BY ELIG CLIN: HCPCS | Performed by: INTERNAL MEDICINE

## 2025-05-23 PROCEDURE — 3078F DIAST BP <80 MM HG: CPT | Performed by: INTERNAL MEDICINE

## 2025-05-23 PROCEDURE — PBSHW AMB POC URINALYSIS DIP STICK AUTO W/ MICRO: Performed by: INTERNAL MEDICINE

## 2025-05-23 RX ORDER — ALBUTEROL SULFATE 90 UG/1
2 INHALANT RESPIRATORY (INHALATION) 4 TIMES DAILY PRN
Qty: 6.7 G | Refills: 5 | Status: SHIPPED | OUTPATIENT
Start: 2025-05-23

## 2025-05-23 RX ORDER — FLUTICASONE FUROATE AND VILANTEROL 100; 25 UG/1; UG/1
1 POWDER RESPIRATORY (INHALATION) DAILY
Qty: 180 EACH | Refills: 3 | Status: SHIPPED | OUTPATIENT
Start: 2025-05-23

## 2025-05-23 NOTE — ASSESSMENT & PLAN NOTE
Refill at mail order  stable    Orders:    fluticasone furoate-vilanterol (BREO ELLIPTA) 100-25 MCG/ACT inhaler; Inhale 1 puff into the lungs daily    albuterol sulfate HFA (PROVENTIL;VENTOLIN;PROAIR) 108 (90 Base) MCG/ACT inhaler; Inhale 2 puffs into the lungs 4 times daily as needed for Wheezing

## 2025-05-23 NOTE — PROGRESS NOTES
Martha Morrison (:  1945) is a 79 y.o. female,Established patient, here for evaluation of the following chief complaint(s):  Urinary Tract Infection (Sx started 2 weeks ago; sx burning, cloudy red urine per patient )         Assessment & Plan  Burning with urination   Fatigue and urinary discomfort, no fevers or n/v  Ua shows 3 +leuk  Start keflex tid and fluids  Seek urgent care if fever or vomiting develops    Orders:  •  AMB POC URINALYSIS DIP STICK AUTO W/ MICRO  •  Culture, Urine; Future    Recurrent UTI       Orders:  •  cephALEXin (KEFLEX) 250 MG capsule; Take 1 capsule by mouth 3 times daily    Paroxysmal atrial fibrillation (HCC)   Stable continue on eliquis and toprol         Moderate persistent asthma without complication   Refill at mail order  stable    Orders:  •  fluticasone furoate-vilanterol (BREO ELLIPTA) 100-25 MCG/ACT inhaler; Inhale 1 puff into the lungs daily  •  albuterol sulfate HFA (PROVENTIL;VENTOLIN;PROAIR) 108 (90 Base) MCG/ACT inhaler; Inhale 2 puffs into the lungs 4 times daily as needed for Wheezing      No follow-ups on file.       Subjective   Urinary Tract Infection  Seen for sick visit.  Notes for roughly 2 weeks she has been more fatigued than usual.  Recently some frequency of urination and burning.  No fevers.  No vomiting.  No severe abdominal pain.  Mostly she is very tired.  UA shows 3+ leukocyte.  Exam does not suggest pyelonephritis.  Discussed empirically start Keflex while waiting on culture.  Discussed fluids.  Discussed urgent care should she develop fever or vomiting.    History of A-fib remains on Eliquis and Toprol.  Has not seen bleeding.  Requests refills on inhalers.  Asthma is stable  Review of Systems       Objective   Physical Exam  Constitutional:       Appearance: Normal appearance.   HENT:      Head: Normocephalic and atraumatic.   Cardiovascular:      Rate and Rhythm: Normal rate and regular rhythm.   Pulmonary:      Effort: Pulmonary effort is

## 2025-05-24 LAB
BACTERIA SPEC CULT: NORMAL
SERVICE CMNT-IMP: NORMAL

## 2025-05-27 ENCOUNTER — TELEPHONE (OUTPATIENT)
Facility: CLINIC | Age: 80
End: 2025-05-27

## 2025-05-27 ENCOUNTER — RESULTS FOLLOW-UP (OUTPATIENT)
Facility: CLINIC | Age: 80
End: 2025-05-27

## 2025-05-27 DIAGNOSIS — R10.30 LOWER ABDOMINAL PAIN: Primary | ICD-10-CM

## 2025-05-27 NOTE — TELEPHONE ENCOUNTER
Patient reports she does not have pain with voiding but she does have a general heaviness & burning in her lower abdomen/ pelvis. Denies fevers & chills. She reports feeling very fatigued & just general malaise. Notified her urine culture does not suggest a full uti. She is taking tylenol but relief does not last. Advised ED eval given the pain in her lower abdomen/pelvis. Advised she needs to be evaluated clinically & may need some imaging. Patient verbalized understanding. She states her symptoms have been going on for a few weeks now. She wants to hold off on the ER visit for now. Advised her culture did not show any significant bacteria so no need to continue with the keflex. She wishes to finish the keflex course & re-evaluate her symptoms after. Discussed this nurse does not suggest waiting to finish the course but if she feels she is worsening she needs an urgent evaluation at the ER. Patient voiced understanding.

## 2025-05-27 NOTE — TELEPHONE ENCOUNTER
Patient states that she is in extreme pain and does not feel the antibiotics are working. Please call the patient to discuss treatment options.

## 2025-05-27 NOTE — TELEPHONE ENCOUNTER
Provider's response: Thank you for talking to her-can you let her know I did place an order for an abdominal ct that she can schedule if the lower abdominal pain persists. Thanks.      Patient notified of provider's message & was thankful for the CT scan order. She will complete if her lower abdominal symptoms do not improve.

## 2025-05-27 NOTE — TELEPHONE ENCOUNTER
Patient called stating the antibiotics previously given for the UTI isn't working, she is confused and experiencing a burning sensation.     [phone] (Home Phone)

## 2025-07-01 ENCOUNTER — TELEPHONE (OUTPATIENT)
Facility: CLINIC | Age: 80
End: 2025-07-01

## 2025-07-01 NOTE — TELEPHONE ENCOUNTER
Called patient to reschedule appointment due to Dr Ball being out of the office from 7/7/25 to 7/11/25.    Left voicemail to return call to reschedule. Will also send [employer] message.

## 2025-07-21 ENCOUNTER — TELEPHONE (OUTPATIENT)
Facility: CLINIC | Age: 80
End: 2025-07-21

## 2025-07-21 SDOH — HEALTH STABILITY: PHYSICAL HEALTH: ON AVERAGE, HOW MANY MINUTES DO YOU ENGAGE IN EXERCISE AT THIS LEVEL?: 30 MIN

## 2025-07-21 SDOH — HEALTH STABILITY: PHYSICAL HEALTH: ON AVERAGE, HOW MANY DAYS PER WEEK DO YOU ENGAGE IN MODERATE TO STRENUOUS EXERCISE (LIKE A BRISK WALK)?: 3 DAYS

## 2025-07-21 ASSESSMENT — PATIENT HEALTH QUESTIONNAIRE - PHQ9
SUM OF ALL RESPONSES TO PHQ QUESTIONS 1-9: 0
1. LITTLE INTEREST OR PLEASURE IN DOING THINGS: NOT AT ALL
SUM OF ALL RESPONSES TO PHQ QUESTIONS 1-9: 0
SUM OF ALL RESPONSES TO PHQ QUESTIONS 1-9: 0
2. FEELING DOWN, DEPRESSED OR HOPELESS: NOT AT ALL
SUM OF ALL RESPONSES TO PHQ QUESTIONS 1-9: 0

## 2025-07-21 ASSESSMENT — LIFESTYLE VARIABLES
HOW OFTEN DO YOU HAVE A DRINK CONTAINING ALCOHOL: 1
HOW OFTEN DO YOU HAVE A DRINK CONTAINING ALCOHOL: NEVER
HOW MANY STANDARD DRINKS CONTAINING ALCOHOL DO YOU HAVE ON A TYPICAL DAY: 0
HOW MANY STANDARD DRINKS CONTAINING ALCOHOL DO YOU HAVE ON A TYPICAL DAY: PATIENT DOES NOT DRINK
HOW OFTEN DO YOU HAVE SIX OR MORE DRINKS ON ONE OCCASION: 1

## 2025-07-21 NOTE — TELEPHONE ENCOUNTER
Contacted patient regarding upcoming Medicare wellness appointment and completion of HRA questionnaire. Questionnaire completed via phone.

## 2025-07-22 ENCOUNTER — OFFICE VISIT (OUTPATIENT)
Facility: CLINIC | Age: 80
End: 2025-07-22
Payer: MEDICARE

## 2025-07-22 VITALS
SYSTOLIC BLOOD PRESSURE: 140 MMHG | OXYGEN SATURATION: 96 % | WEIGHT: 166 LBS | RESPIRATION RATE: 16 BRPM | BODY MASS INDEX: 26.68 KG/M2 | HEIGHT: 66 IN | TEMPERATURE: 97.7 F | HEART RATE: 61 BPM | DIASTOLIC BLOOD PRESSURE: 82 MMHG

## 2025-07-22 DIAGNOSIS — I48.0 PAROXYSMAL ATRIAL FIBRILLATION (HCC): ICD-10-CM

## 2025-07-22 DIAGNOSIS — J45.40 MODERATE PERSISTENT ASTHMA WITHOUT COMPLICATION: ICD-10-CM

## 2025-07-22 DIAGNOSIS — I10 HTN, GOAL BELOW 130/80: ICD-10-CM

## 2025-07-22 DIAGNOSIS — R53.83 OTHER FATIGUE: ICD-10-CM

## 2025-07-22 DIAGNOSIS — Z13.1 SCREENING FOR DIABETES MELLITUS: ICD-10-CM

## 2025-07-22 DIAGNOSIS — N39.0 RECURRENT UTI: ICD-10-CM

## 2025-07-22 DIAGNOSIS — Z00.00 MEDICARE ANNUAL WELLNESS VISIT, SUBSEQUENT: Primary | ICD-10-CM

## 2025-07-22 PROCEDURE — 1126F AMNT PAIN NOTED NONE PRSNT: CPT | Performed by: INTERNAL MEDICINE

## 2025-07-22 PROCEDURE — G0439 PPPS, SUBSEQ VISIT: HCPCS | Performed by: INTERNAL MEDICINE

## 2025-07-22 PROCEDURE — 1159F MED LIST DOCD IN RCRD: CPT | Performed by: INTERNAL MEDICINE

## 2025-07-22 PROCEDURE — G8427 DOCREV CUR MEDS BY ELIG CLIN: HCPCS | Performed by: INTERNAL MEDICINE

## 2025-07-22 PROCEDURE — 1123F ACP DISCUSS/DSCN MKR DOCD: CPT | Performed by: INTERNAL MEDICINE

## 2025-07-22 PROCEDURE — 1036F TOBACCO NON-USER: CPT | Performed by: INTERNAL MEDICINE

## 2025-07-22 PROCEDURE — 1160F RVW MEDS BY RX/DR IN RCRD: CPT | Performed by: INTERNAL MEDICINE

## 2025-07-22 PROCEDURE — 3077F SYST BP >= 140 MM HG: CPT | Performed by: INTERNAL MEDICINE

## 2025-07-22 PROCEDURE — 99214 OFFICE O/P EST MOD 30 MIN: CPT | Performed by: INTERNAL MEDICINE

## 2025-07-22 PROCEDURE — G8399 PT W/DXA RESULTS DOCUMENT: HCPCS | Performed by: INTERNAL MEDICINE

## 2025-07-22 PROCEDURE — G8419 CALC BMI OUT NRM PARAM NOF/U: HCPCS | Performed by: INTERNAL MEDICINE

## 2025-07-22 PROCEDURE — 3079F DIAST BP 80-89 MM HG: CPT | Performed by: INTERNAL MEDICINE

## 2025-07-22 PROCEDURE — 1090F PRES/ABSN URINE INCON ASSESS: CPT | Performed by: INTERNAL MEDICINE

## 2025-07-22 RX ORDER — HYDRALAZINE HYDROCHLORIDE 50 MG/1
50 TABLET, FILM COATED ORAL 2 TIMES DAILY
Qty: 60 TABLET | Refills: 5 | Status: SHIPPED | OUTPATIENT
Start: 2025-07-22

## 2025-07-22 RX ORDER — LOSARTAN POTASSIUM 50 MG/1
50 TABLET ORAL 2 TIMES DAILY
Qty: 180 TABLET | Refills: 1 | Status: SHIPPED | OUTPATIENT
Start: 2025-07-22

## 2025-07-22 RX ORDER — ESTRADIOL 0.1 MG/G
CREAM VAGINAL
Qty: 42 G | Refills: 0 | Status: SHIPPED | OUTPATIENT
Start: 2025-07-22

## 2025-07-22 RX ORDER — FLUTICASONE FUROATE AND VILANTEROL 100; 25 UG/1; UG/1
1 POWDER RESPIRATORY (INHALATION) DAILY
Qty: 180 EACH | Refills: 3 | Status: SHIPPED | OUTPATIENT
Start: 2025-07-22

## 2025-07-22 NOTE — PATIENT INSTRUCTIONS
Please get a flu shot and a new covid shot this fall ( September or October) also please get a tdap vaccine in the next few months       Hearing Loss: Care Instructions  Overview     Hearing loss is a sudden or slow decrease in how well you hear. It can range from slight to profound. Permanent hearing loss can occur with aging. It also can happen when you are exposed long-term to loud noise. Examples include listening to loud music, riding motorcycles, or being around other loud machines.  Hearing loss can affect your work and home life. It can make you feel lonely or depressed. You may feel that you have lost your independence. But hearing aids and other devices can help you hear better and feel connected to others.  Follow-up care is a key part of your treatment and safety. Be sure to make and go to all appointments, and call your doctor if you are having problems. It's also a good idea to know your test results and keep a list of the medicines you take.  How can you care for yourself at home?  Avoid loud noises whenever possible. This helps keep your hearing from getting worse.  Always wear hearing protection around loud noises.  Wear a hearing aid as directed.  A professional can help you pick a hearing aid that will work best for you.  You can also get hearing aids over the counter for mild to moderate hearing loss.  Have hearing tests as your doctor suggests. They can show whether your hearing has changed. Your hearing aid may need to be adjusted.  Use other devices as needed. These may include:  Telephone amplifiers and hearing aids that can connect to a television, stereo, radio, or microphone.  Devices that use lights or vibrations. These alert you to the doorbell, a ringing telephone, or a baby monitor.  Television closed-captioning. This shows the words at the bottom of the screen. Most new TVs can do this.  TTY (text telephone). This lets you type messages back and forth on the telephone instead of

## 2025-07-22 NOTE — PROGRESS NOTES
Martha Morrison (:  1945) is a 79 y.o. female,Established patient, here for evaluation of the following chief complaint(s):  Medicare AWV (AWV)         Assessment & Plan  Medicare annual wellness visit, subsequent  Declines Tdap today.  Encouraged flu and COVID boosters this fall.  Looking forward to travel to [place] and encouraged to this.         HTN, goal below 130/80  Reasonable control.  Will change hydralazine from 25 mg 2 tabs twice daily to 50 mg twice daily to decrease pill count.  Continue losartan, continue Toprol    Orders:    hydrALAZINE (APRESOLINE) 50 MG tablet; Take 1 tablet by mouth in the morning and at bedtime    losartan (COZAAR) 50 MG tablet; Take 1 tablet by mouth 2 times daily    Comprehensive Metabolic Panel; Future    Moderate persistent asthma without complication  Currently stable continue Breo    Orders:    fluticasone furoate-vilanterol (BREO ELLIPTA) 100-25 MCG/ACT inhaler; Inhale 1 puff into the lungs daily    Recurrent UTI  No recent flares.  Continue Estrace cream 3 days a week    Orders:    estradiol (ESTRACE) 0.1 MG/GM vaginal cream; Topically 3 days a week    Paroxysmal atrial fibrillation (HCC)  No recent bleeding complications no recent falls continue Eliquis for stroke prevention    Orders:    apixaban (ELIQUIS) 5 MG TABS tablet; Take 1 tablet by mouth 2 times daily *short term supply to last until mail order shipment arrives*    Other fatigue  Chronic.  Rule out anemia rule out hypothyroidism    Orders:    CBC; Future    TSH; Future    Screening for diabetes mellitus       Orders:    Hemoglobin A1C; Future      No follow-ups on file.       Subjective   HPI  Seen for wellness visit.  She is fairly stable.  Only complaint is fatigue.  Recently went to the beach.  Will be going with her family to [place] in August.  No recent chest pain.  No flares of asthma.  No recent bleeding.  Has had recurrent UTIs but no recent episodes.  Does have intermittent mild burning

## 2025-07-22 NOTE — ASSESSMENT & PLAN NOTE
No recent flares.  Continue Estrace cream 3 days a week    Orders:    estradiol (ESTRACE) 0.1 MG/GM vaginal cream; Topically 3 days a week

## 2025-07-22 NOTE — ASSESSMENT & PLAN NOTE
Currently stable continue Breo    Orders:    fluticasone furoate-vilanterol (BREO ELLIPTA) 100-25 MCG/ACT inhaler; Inhale 1 puff into the lungs daily

## 2025-07-22 NOTE — ASSESSMENT & PLAN NOTE
No recent bleeding complications no recent falls continue Eliquis for stroke prevention    Orders:    apixaban (ELIQUIS) 5 MG TABS tablet; Take 1 tablet by mouth 2 times daily *short term supply to last until mail order shipment arrives*

## 2025-07-23 ENCOUNTER — RESULTS FOLLOW-UP (OUTPATIENT)
Facility: CLINIC | Age: 80
End: 2025-07-23

## 2025-07-23 LAB
ALBUMIN SERPL-MCNC: 3.8 G/DL (ref 3.5–5)
ALBUMIN/GLOB SERPL: 1 (ref 1.1–2.2)
ALP SERPL-CCNC: 73 U/L (ref 45–117)
ALT SERPL-CCNC: 40 U/L (ref 12–78)
ANION GAP SERPL CALC-SCNC: 7 MMOL/L (ref 2–12)
AST SERPL-CCNC: 20 U/L (ref 15–37)
BILIRUB SERPL-MCNC: 0.4 MG/DL (ref 0.2–1)
BUN SERPL-MCNC: 21 MG/DL (ref 6–20)
BUN/CREAT SERPL: 25 (ref 12–20)
CALCIUM SERPL-MCNC: 9.6 MG/DL (ref 8.5–10.1)
CHLORIDE SERPL-SCNC: 99 MMOL/L (ref 97–108)
CO2 SERPL-SCNC: 30 MMOL/L (ref 21–32)
CREAT SERPL-MCNC: 0.84 MG/DL (ref 0.55–1.02)
ERYTHROCYTE [DISTWIDTH] IN BLOOD BY AUTOMATED COUNT: 13.2 % (ref 11.5–14.5)
EST. AVERAGE GLUCOSE BLD GHB EST-MCNC: 126 MG/DL
GLOBULIN SER CALC-MCNC: 3.7 G/DL (ref 2–4)
GLUCOSE SERPL-MCNC: 96 MG/DL (ref 65–100)
HBA1C MFR BLD: 6 % (ref 4–5.6)
HCT VFR BLD AUTO: 41.4 % (ref 35–47)
HGB BLD-MCNC: 12.8 G/DL (ref 11.5–16)
MCH RBC QN AUTO: 30 PG (ref 26–34)
MCHC RBC AUTO-ENTMCNC: 30.9 G/DL (ref 30–36.5)
MCV RBC AUTO: 97.2 FL (ref 80–99)
NRBC # BLD: 0 K/UL (ref 0–0.01)
NRBC BLD-RTO: 0 PER 100 WBC
PLATELET # BLD AUTO: 321 K/UL (ref 150–400)
PMV BLD AUTO: 10.4 FL (ref 8.9–12.9)
POTASSIUM SERPL-SCNC: 4.1 MMOL/L (ref 3.5–5.1)
PROT SERPL-MCNC: 7.5 G/DL (ref 6.4–8.2)
RBC # BLD AUTO: 4.26 M/UL (ref 3.8–5.2)
SODIUM SERPL-SCNC: 136 MMOL/L (ref 136–145)
TSH SERPL DL<=0.05 MIU/L-ACNC: 2.85 UIU/ML (ref 0.36–3.74)
WBC # BLD AUTO: 8 K/UL (ref 3.6–11)

## 2025-08-28 ENCOUNTER — OFFICE VISIT (OUTPATIENT)
Facility: CLINIC | Age: 80
End: 2025-08-28
Payer: MEDICARE

## 2025-08-28 VITALS
BODY MASS INDEX: 26.68 KG/M2 | HEIGHT: 66 IN | DIASTOLIC BLOOD PRESSURE: 70 MMHG | OXYGEN SATURATION: 93 % | WEIGHT: 166 LBS | RESPIRATION RATE: 16 BRPM | HEART RATE: 76 BPM | TEMPERATURE: 97.8 F | SYSTOLIC BLOOD PRESSURE: 132 MMHG

## 2025-08-28 DIAGNOSIS — U07.1 COVID-19: ICD-10-CM

## 2025-08-28 DIAGNOSIS — Z79.01 CHRONIC ANTICOAGULATION: ICD-10-CM

## 2025-08-28 DIAGNOSIS — J45.40 MODERATE PERSISTENT ASTHMA WITHOUT COMPLICATION: ICD-10-CM

## 2025-08-28 DIAGNOSIS — J40 BRONCHITIS: ICD-10-CM

## 2025-08-28 DIAGNOSIS — R05.1 ACUTE COUGH: Primary | ICD-10-CM

## 2025-08-28 LAB
LOT EXPIRE DATE: ABNORMAL
LOT KIT NUMBER: ABNORMAL
SARS-COV-2, POC: DETECTED
VALID INTERNAL CONTROL: YES
VENDOR AND KIT NAME POC: ABNORMAL

## 2025-08-28 PROCEDURE — G8427 DOCREV CUR MEDS BY ELIG CLIN: HCPCS | Performed by: INTERNAL MEDICINE

## 2025-08-28 PROCEDURE — G8419 CALC BMI OUT NRM PARAM NOF/U: HCPCS | Performed by: INTERNAL MEDICINE

## 2025-08-28 PROCEDURE — 1159F MED LIST DOCD IN RCRD: CPT | Performed by: INTERNAL MEDICINE

## 2025-08-28 PROCEDURE — 3075F SYST BP GE 130 - 139MM HG: CPT | Performed by: INTERNAL MEDICINE

## 2025-08-28 PROCEDURE — G8399 PT W/DXA RESULTS DOCUMENT: HCPCS | Performed by: INTERNAL MEDICINE

## 2025-08-28 PROCEDURE — 99214 OFFICE O/P EST MOD 30 MIN: CPT | Performed by: INTERNAL MEDICINE

## 2025-08-28 PROCEDURE — 1126F AMNT PAIN NOTED NONE PRSNT: CPT | Performed by: INTERNAL MEDICINE

## 2025-08-28 PROCEDURE — 87426 SARSCOV CORONAVIRUS AG IA: CPT | Performed by: INTERNAL MEDICINE

## 2025-08-28 PROCEDURE — 1036F TOBACCO NON-USER: CPT | Performed by: INTERNAL MEDICINE

## 2025-08-28 PROCEDURE — PBSHW AMB POC SARS-COV-2: Performed by: INTERNAL MEDICINE

## 2025-08-28 PROCEDURE — 1123F ACP DISCUSS/DSCN MKR DOCD: CPT | Performed by: INTERNAL MEDICINE

## 2025-08-28 PROCEDURE — 3078F DIAST BP <80 MM HG: CPT | Performed by: INTERNAL MEDICINE

## 2025-08-28 PROCEDURE — 1090F PRES/ABSN URINE INCON ASSESS: CPT | Performed by: INTERNAL MEDICINE

## 2025-08-28 RX ORDER — CEFDINIR 300 MG/1
300 CAPSULE ORAL 2 TIMES DAILY
Qty: 14 CAPSULE | Refills: 0 | Status: SHIPPED | OUTPATIENT
Start: 2025-08-28 | End: 2025-09-04

## 2025-08-28 RX ORDER — ALBUTEROL SULFATE 90 UG/1
2 INHALANT RESPIRATORY (INHALATION) 4 TIMES DAILY PRN
Qty: 6.7 G | Refills: 5 | Status: SHIPPED | OUTPATIENT
Start: 2025-08-28

## 2025-08-28 RX ORDER — PREDNISONE 20 MG/1
20 TABLET ORAL DAILY
Qty: 6 TABLET | Refills: 0 | Status: SHIPPED | OUTPATIENT
Start: 2025-08-28 | End: 2025-09-03

## 2025-08-28 ASSESSMENT — ENCOUNTER SYMPTOMS: COUGH: 1

## 2025-09-05 ENCOUNTER — OFFICE VISIT (OUTPATIENT)
Facility: CLINIC | Age: 80
End: 2025-09-05
Payer: MEDICARE

## 2025-09-05 VITALS
TEMPERATURE: 98 F | WEIGHT: 166 LBS | SYSTOLIC BLOOD PRESSURE: 142 MMHG | BODY MASS INDEX: 26.68 KG/M2 | RESPIRATION RATE: 16 BRPM | OXYGEN SATURATION: 97 % | HEART RATE: 71 BPM | DIASTOLIC BLOOD PRESSURE: 74 MMHG | HEIGHT: 66 IN

## 2025-09-05 DIAGNOSIS — U07.1 COVID-19: Primary | ICD-10-CM

## 2025-09-05 DIAGNOSIS — R41.3 MEMORY LOSS: ICD-10-CM

## 2025-09-05 DIAGNOSIS — I10 HTN, GOAL BELOW 130/80: ICD-10-CM

## 2025-09-05 DIAGNOSIS — J40 BRONCHITIS: ICD-10-CM

## 2025-09-05 PROCEDURE — 3078F DIAST BP <80 MM HG: CPT | Performed by: INTERNAL MEDICINE

## 2025-09-05 PROCEDURE — 3077F SYST BP >= 140 MM HG: CPT | Performed by: INTERNAL MEDICINE

## 2025-09-05 PROCEDURE — 99213 OFFICE O/P EST LOW 20 MIN: CPT | Performed by: INTERNAL MEDICINE

## 2025-09-05 PROCEDURE — G8427 DOCREV CUR MEDS BY ELIG CLIN: HCPCS | Performed by: INTERNAL MEDICINE

## 2025-09-05 PROCEDURE — 1036F TOBACCO NON-USER: CPT | Performed by: INTERNAL MEDICINE

## 2025-09-05 PROCEDURE — 1160F RVW MEDS BY RX/DR IN RCRD: CPT | Performed by: INTERNAL MEDICINE

## 2025-09-05 PROCEDURE — 1159F MED LIST DOCD IN RCRD: CPT | Performed by: INTERNAL MEDICINE

## 2025-09-05 PROCEDURE — 1090F PRES/ABSN URINE INCON ASSESS: CPT | Performed by: INTERNAL MEDICINE

## 2025-09-05 PROCEDURE — 1123F ACP DISCUSS/DSCN MKR DOCD: CPT | Performed by: INTERNAL MEDICINE

## 2025-09-05 PROCEDURE — G8399 PT W/DXA RESULTS DOCUMENT: HCPCS | Performed by: INTERNAL MEDICINE

## 2025-09-05 PROCEDURE — G8419 CALC BMI OUT NRM PARAM NOF/U: HCPCS | Performed by: INTERNAL MEDICINE

## 2025-09-05 PROCEDURE — 1126F AMNT PAIN NOTED NONE PRSNT: CPT | Performed by: INTERNAL MEDICINE

## 2025-09-05 ASSESSMENT — ENCOUNTER SYMPTOMS: COUGH: 1
